# Patient Record
Sex: MALE | Race: WHITE | NOT HISPANIC OR LATINO | Employment: FULL TIME | URBAN - METROPOLITAN AREA
[De-identification: names, ages, dates, MRNs, and addresses within clinical notes are randomized per-mention and may not be internally consistent; named-entity substitution may affect disease eponyms.]

---

## 2019-02-03 ENCOUNTER — HOSPITAL ENCOUNTER (EMERGENCY)
Facility: HOSPITAL | Age: 44
Discharge: HOME | End: 2019-02-03
Attending: EMERGENCY MEDICINE | Admitting: EMERGENCY MEDICINE
Payer: COMMERCIAL

## 2019-02-03 ENCOUNTER — APPOINTMENT (EMERGENCY)
Dept: RADIOLOGY | Facility: HOSPITAL | Age: 44
End: 2019-02-03
Payer: COMMERCIAL

## 2019-02-03 VITALS
DIASTOLIC BLOOD PRESSURE: 88 MMHG | SYSTOLIC BLOOD PRESSURE: 128 MMHG | RESPIRATION RATE: 16 BRPM | WEIGHT: 203.04 LBS | OXYGEN SATURATION: 99 % | BODY MASS INDEX: 28.32 KG/M2 | TEMPERATURE: 98 F | HEART RATE: 66 BPM

## 2019-02-03 DIAGNOSIS — S29.012A UPPER BACK STRAIN, INITIAL ENCOUNTER: Primary | ICD-10-CM

## 2019-02-03 PROCEDURE — 99283 EMERGENCY DEPT VISIT LOW MDM: CPT

## 2019-02-03 PROCEDURE — 96372 THER/PROPH/DIAG INJ SC/IM: CPT

## 2019-02-03 PROCEDURE — 72072 X-RAY EXAM THORAC SPINE 3VWS: CPT

## 2019-02-03 RX ORDER — KETOROLAC TROMETHAMINE 30 MG/ML
30 INJECTION, SOLUTION INTRAMUSCULAR; INTRAVENOUS ONCE
Status: COMPLETED | OUTPATIENT
Start: 2019-02-03 | End: 2019-02-03

## 2019-02-03 RX ORDER — CYCLOBENZAPRINE HCL 5 MG
5 TABLET ORAL 3 TIMES DAILY PRN
Qty: 30 TABLET | Refills: 0 | Status: SHIPPED | OUTPATIENT
Start: 2019-02-03 | End: 2019-10-09 | Stop reason: ALTCHOICE

## 2019-02-03 RX ORDER — DIAZEPAM 5 MG/1
5 TABLET ORAL
Qty: 10 TABLET | Refills: 0 | Status: SHIPPED | OUTPATIENT
Start: 2019-02-03 | End: 2019-10-09 | Stop reason: ALTCHOICE

## 2019-02-03 RX ORDER — DIAZEPAM 5 MG/ML
5 INJECTION, SOLUTION INTRAMUSCULAR; INTRAVENOUS ONCE
Status: COMPLETED | OUTPATIENT
Start: 2019-02-03 | End: 2019-02-03

## 2019-02-03 RX ADMIN — KETOROLAC TROMETHAMINE 30 MG: 30 INJECTION, SOLUTION INTRAMUSCULAR at 15:46

## 2019-02-03 RX ADMIN — Medication 5 MG: at 15:46

## 2019-02-03 NOTE — ED PROVIDER NOTES
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  Was very cramped, using tools predominately w his R hand  Has R shoulder soreness  However now has moderate to severe pain in his upper thoracic spine w some paraspinal muscle soreness described  Denies numbness or tingling of arms / legs  No LE pain or weakness  Denies saddle anesthesia and bladder or bowel incontinence  He denies weakness in arms  He has pain in the back any time he twists his spine or rotates his neck or moves his arms  None       History reviewed  No pertinent past medical history  Past Surgical History:   Procedure Laterality Date    SHOULDER SURGERY         History reviewed  No pertinent family history  I have reviewed and agree with the history as documented  Social History   Substance Use Topics    Smoking status: Never Smoker    Smokeless tobacco: Never Used    Alcohol use No        Review of Systems   Constitutional: Negative for activity change, chills, diaphoresis, fatigue and fever  HENT: Negative for congestion and rhinorrhea  Eyes: Negative for pain  Respiratory: Negative for cough, chest tightness, shortness of breath and wheezing  Cardiovascular: Negative for chest pain and palpitations  Gastrointestinal: Negative for abdominal distention, constipation, diarrhea, nausea and vomiting  Genitourinary: Negative for difficulty urinating and dysuria  Musculoskeletal: Positive for back pain  Negative for arthralgias and myalgias     Neurological: Negative for dizziness, weakness, light-headedness and headaches  Psychiatric/Behavioral: The patient is not nervous/anxious  Physical Exam  Physical Exam   Constitutional: He is oriented to person, place, and time  He appears well-developed and well-nourished  No distress  HENT:   Head: Normocephalic and atraumatic  Eyes: Pupils are equal, round, and reactive to light  Neck: Normal range of motion  Neck supple  No tracheal deviation present  Cardiovascular: Normal rate, regular rhythm, normal heart sounds and intact distal pulses  Exam reveals no gallop and no friction rub  No murmur heard  Pulmonary/Chest: Effort normal and breath sounds normal  No respiratory distress  He has no wheezes  He has no rales  Abdominal: Soft  Bowel sounds are normal  He exhibits no distension and no mass  There is no tenderness  There is no guarding  Musculoskeletal: He exhibits no edema or deformity  Midline ttp along the upper thoracic vertebrae   There is no stepoff or deformity  Patient has mild ttp of adjacent paraspinal muscles  No c spine ttp   Pain w twisting spine or elevating arms but he has normal arm strength / sensation in all muscles groups UE, equal bilaterally   Neurological: He is alert and oriented to person, place, and time  Skin: Skin is warm and dry  He is not diaphoretic  Psychiatric: He has a normal mood and affect  His behavior is normal    Nursing note and vitals reviewed        Vital Signs  ED Triage Vitals [02/03/19 1452]   Temperature Pulse Respirations Blood Pressure SpO2   98 °F (36 7 °C) 66 16 135/83 98 %      Temp Source Heart Rate Source Patient Position - Orthostatic VS BP Location FiO2 (%)   Oral Monitor Sitting Right arm --      Pain Score       7           Vitals:    02/03/19 1452 02/03/19 1620   BP: 135/83 128/88   Pulse: 66 66   Patient Position - Orthostatic VS: Sitting Sitting       Visual Acuity      ED Medications  Medications   ketorolac (TORADOL) injection 30 mg (30 mg Intramuscular Given 2/3/19 1546)   diazepam (VALIUM) injection 5 mg (5 mg Intramuscular Given 2/3/19 1546)       Diagnostic Studies  Results Reviewed     None                 XR spine thoracic 3 views    (Results Pending)              Procedures  Procedures       Phone Contacts  ED Phone Contact    ED Course                               MDM  Number of Diagnoses or Management Options  Upper back strain, initial encounter:   Diagnosis management comments: DDX includes but not ltd to:   Concern for strain of thoracic spine, surrounding musculature v herniated disk  There are no radicular sx   No sx of cord compression on physical exam  No ambulatory dysfunction or sx of cauda equina    Plan is to obtain:  XR of affected joint(s) for acute osseous abnormality     Based on results:  No acute abn on imaging  Will refer to comprehensive spine program and have pt follow w nsgx  Muscle relaxers PRN w nsaids and tylenol  Return parameters discussed  Pt requires f/u as an outpt  Pt expresses understanding w above treatment plan  All questions answered prior to d/c  Portions of the record may have been created with voice recognition software   Occasional wrong word or "sound a like" substitutions may have occurred due to the inherent limitations of voice recognition software   Read the chart carefully and recognize, using context, where substitutions have occurred  Disposition  Final diagnoses:   Upper back strain, initial encounter     Time reflects when diagnosis was documented in both MDM as applicable and the Disposition within this note     Time User Action Codes Description Comment    2/3/2019  4:10 PM Trisha Mcmahan Add [S29 012A] Upper back strain, initial encounter       ED Disposition     ED Disposition Condition Date/Time Comment    Discharge  Sun Feb 3, 2019  4:10 PM Deepika Jenkins discharge to home/self care      Condition at discharge: Good        Follow-up Information     Follow up With Specialties Details Why Contact Info Additional Information    [address] [facility] Emergency Department Emergency Medicine  If symptoms worsen [address] [facility] [place] [address]  [phone] [place] ED, [address], [facility], [address] [facility], [address] [facility] Neurosurgery Call in 1 day  [address] [facility] [address]  [address], [address] [facility], [id number]          Discharge Medication List as of 2/3/2019  4:13 PM      START taking these medications    Details   cyclobenzaprine (FLEXERIL) 5 mg tablet Take 1 tablet (5 mg total) by mouth 3 (three) times a day as needed for muscle spasms for up to 30 doses, Starting Sun 2/3/2019, Print      diazepam (VALIUM) 5 mg tablet Take 1 tablet (5 mg total) by mouth daily at bedtime as needed for anxiety for up to 10 days, Starting Sun 2/3/2019, Until Wed 2/13/2019, Print             Outpatient Discharge Orders  Ambulatory Referral to Comprehensive Spine Program   Standing Status: Future  Standing Exp   Date: 08/03/19         ED Provider  Electronically Signed by           Tavo Velez PA-C  02/03/19 1629

## 2019-02-03 NOTE — DISCHARGE INSTRUCTIONS
[place] [facility] [place]  PROGRESS NOTE  Chief Complaint:   No chief complaint on file. HPI:   This is a 39year old male coming in for his annual physical.    He said that he is not having any trouble with the simvastatin.   Is not causing any si Thoracic Pain   WHAT YOU NEED TO KNOW:   Thoracic pain is discomfort in any area between your neck and your abdomen  Thoracic pain may be caused by health conditions that affect your gastrointestinal system, lungs, bones, or muscles  It can also be caused by trauma, panic attacks, or anxiety related to stress  DISCHARGE INSTRUCTIONS:   Return to the emergency department if:   · You have a period of thoracic pain that lasts longer than 5 minutes  · Your thoracic pain gets worse  · You have a history of angina (pressure or squeezing chest pain) and your usual medicine does not work  · You have thoracic pain with shortness of breath, sweating, dizziness, vomiting, or nausea  · You have thoracic pain that spreads to your arm, neck, back, jaw, or stomach  Contact your healthcare provider or specialist if:   · Your thoracic pain is not relieved by resting, heat, or medicines  · You have questions or concerns about your condition or care  Medicines: You may need any of the following:  · Acetaminophen  decreases pain  It is available without a prescription  Ask how much to take and how often to take it  Follow directions  Acetaminophen can cause liver damage if not taken correctly  · NSAIDs , such as ibuprofen, help decrease swelling, pain, and fever  This medicine is available with or without a doctor's order  NSAIDs can cause stomach bleeding or kidney problems in certain people  If you take blood thinner medicine, always ask if NSAIDs are safe for you  Always read the medicine label and follow directions  Do not give these medicines to children under 10months of age without direction from your child's healthcare provider  · Take your medicine as directed  Contact your healthcare provider if you think your medicine is not helping or if you have side effects  Tell him of her if you are allergic to any medicine  Keep a list of the medicines, vitamins, and herbs you take   Include the - 46.3 fL   Neutrophil Absolute Prelim 3.00 1.30 - 6.70 x10 (3) uL   Neutrophil Absolute 3.00 1.30 - 6.70 x10(3) uL   Lymphocyte Absolute 1.88 0.90 - 4.00 x10(3) uL   Monocyte Absolute 0.38 0.10 - 0.60 x10(3) uL   Eosinophil Absolute 0.11 0.00 - 0.30 x10(3 amounts, and when and why you take them  Bring the list or the pill bottles to follow-up visits  Carry your medicine list with you in case of an emergency  Follow up with your healthcare provider or specialist as directed:  Write down your questions so you remember to ask them during your visits  Self-care:   · Apply heat  to the area  Heat helps decrease pain and muscle spasms  Apply heat on the area for 20 to 30 minutes every 2 hours for as many days as directed  · Limit physical activity that causes pain  Rest as needed  Ask your healthcare provider how long you should limit activity  © 2017 [address] [facility] Information is for End User's use only and may not be sold, redistributed or otherwise used for commercial purposes  All illustrations and images included in CareNotes® are the copyrighted property of [employer]  or Marito Stanley  The above information is an  only  It is not intended as medical advice for individual conditions or treatments  Talk to your doctor, nurse or pharmacist before following any medical regimen to see if it is safe and effective for you  rest.  RESPIRATORY:  Denies shortness of breath, wheezing, cough or sputum. GASTROINTESTINAL: He denies any vomiting or diarrhea. He said that he has not been having hard stools. However his hemorrhoid symptoms have started to return.   MUSCULOSKELETAL: bowel sounds normal in all 4 quadrants, no masses, no hepatosplenomegaly. EXTREMITIES:  No edema, no cyanosis, no clubbing, FROM, 2+ dorsalis pedis pulses bilaterally. NEURO:  No deficit, normal gait, strength and tone, sensory intact, normal reflexes. obesity due to excess calories without serious comorbidity with body mass index (BMI) of 36.0 to 36.9 in adult      Tez Dill MD  5/4/2018  8:49 AM

## 2019-02-04 ENCOUNTER — TELEPHONE (OUTPATIENT)
Dept: PHYSICAL THERAPY | Facility: OTHER | Age: 44
End: 2019-02-04

## 2019-02-08 ENCOUNTER — TELEPHONE (OUTPATIENT)
Dept: PHYSICAL THERAPY | Facility: OTHER | Age: 44
End: 2019-02-08

## 2019-08-20 DIAGNOSIS — Z00.00 ANNUAL PHYSICAL EXAM: Primary | ICD-10-CM

## 2019-09-07 ENCOUNTER — APPOINTMENT (OUTPATIENT)
Dept: LAB | Facility: CLINIC | Age: 44
End: 2019-09-07
Payer: COMMERCIAL

## 2019-09-07 DIAGNOSIS — Z00.00 ANNUAL PHYSICAL EXAM: ICD-10-CM

## 2019-09-07 LAB
ALBUMIN SERPL BCP-MCNC: 4.3 G/DL (ref 3.5–5)
ALP SERPL-CCNC: 72 U/L (ref 46–116)
ALT SERPL W P-5'-P-CCNC: 32 U/L (ref 12–78)
ANION GAP SERPL CALCULATED.3IONS-SCNC: 8 MMOL/L (ref 4–13)
AST SERPL W P-5'-P-CCNC: 15 U/L (ref 5–45)
BILIRUB SERPL-MCNC: 0.78 MG/DL (ref 0.2–1)
BUN SERPL-MCNC: 15 MG/DL (ref 5–25)
CALCIUM SERPL-MCNC: 9.1 MG/DL (ref 8.3–10.1)
CHLORIDE SERPL-SCNC: 105 MMOL/L (ref 100–108)
CHOLEST SERPL-MCNC: 187 MG/DL (ref 50–200)
CO2 SERPL-SCNC: 28 MMOL/L (ref 21–32)
CREAT SERPL-MCNC: 1.02 MG/DL (ref 0.6–1.3)
GFR SERPL CREATININE-BSD FRML MDRD: 89 ML/MIN/1.73SQ M
GLUCOSE P FAST SERPL-MCNC: 96 MG/DL (ref 65–99)
HDLC SERPL-MCNC: 35 MG/DL (ref 40–60)
LDLC SERPL CALC-MCNC: 123 MG/DL (ref 0–100)
NONHDLC SERPL-MCNC: 152 MG/DL
POTASSIUM SERPL-SCNC: 4.1 MMOL/L (ref 3.5–5.3)
PROT SERPL-MCNC: 7.2 G/DL (ref 6.4–8.2)
SODIUM SERPL-SCNC: 141 MMOL/L (ref 136–145)
TRIGL SERPL-MCNC: 145 MG/DL

## 2019-09-07 PROCEDURE — 36415 COLL VENOUS BLD VENIPUNCTURE: CPT

## 2019-09-07 PROCEDURE — 80061 LIPID PANEL: CPT

## 2019-09-07 PROCEDURE — 80053 COMPREHEN METABOLIC PANEL: CPT

## 2019-10-09 ENCOUNTER — OFFICE VISIT (OUTPATIENT)
Dept: FAMILY MEDICINE CLINIC | Facility: CLINIC | Age: 44
End: 2019-10-09
Payer: COMMERCIAL

## 2019-10-09 VITALS
HEART RATE: 80 BPM | DIASTOLIC BLOOD PRESSURE: 78 MMHG | WEIGHT: 203 LBS | OXYGEN SATURATION: 97 % | BODY MASS INDEX: 28.42 KG/M2 | HEIGHT: 71 IN | SYSTOLIC BLOOD PRESSURE: 118 MMHG

## 2019-10-09 DIAGNOSIS — Z00.00 ANNUAL PHYSICAL EXAM: Primary | ICD-10-CM

## 2019-10-09 PROCEDURE — 99396 PREV VISIT EST AGE 40-64: CPT | Performed by: FAMILY MEDICINE

## 2019-10-09 NOTE — PROGRESS NOTES
Assessment/Plan:       Problem List Items Addressed This Visit        Other    Annual physical exam - Primary     Annual physical exam completed patient in good health overall labs reviewed at this time total cholesterol 187 all other lab work in normal range follow-up in 1 year patient will continue to stay active exercise and keep his weight down and watch diet                 Subjective:      Patient ID: Barry Zapata is a 40 y o  male  Patient presents for annual physical exam for appointment doing well no new problems or complaints he had lab work done which will be reviewed at today's visit      The following portions of the patient's history were reviewed and updated as appropriate: allergies, current medications, past family history, past medical history, past social history, past surgical history and problem list     Review of Systems   Constitutional: Negative for chills, fatigue and fever  HENT: Negative for congestion, nosebleeds, rhinorrhea, sinus pressure and sore throat  Eyes: Negative for discharge and redness  Respiratory: Negative for cough and shortness of breath  Cardiovascular: Negative for chest pain, palpitations and leg swelling  Gastrointestinal: Negative for abdominal pain, blood in stool and nausea  Endocrine: Negative for cold intolerance, heat intolerance and polyuria  Genitourinary: Negative for dysuria and frequency  Musculoskeletal: Negative for arthralgias, back pain and myalgias  Skin: Negative for rash  Neurological: Negative for dizziness, weakness and headaches  Hematological: Negative for adenopathy  Psychiatric/Behavioral: Negative for behavioral problems and sleep disturbance  The patient is not nervous/anxious            Objective:      /78 (BP Location: Left arm, Patient Position: Sitting)   Pulse 80   Ht 5' 11" (1 803 m)   Wt 92 1 kg (203 lb)   SpO2 97%   BMI 28 31 kg/m²        Physical Exam   Constitutional: He is oriented to person, place, and time  He appears well-developed and well-nourished  HENT:   Head: Normocephalic and atraumatic  Right Ear: External ear normal    Left Ear: External ear normal    Nose: Nose normal    Mouth/Throat: Oropharynx is clear and moist    Eyes: Pupils are equal, round, and reactive to light  Conjunctivae and EOM are normal  No scleral icterus  Neck: Normal range of motion  Neck supple  No JVD present  No thyromegaly present  Cardiovascular: Normal rate, regular rhythm and normal heart sounds  No murmur heard  Pulmonary/Chest: Effort normal and breath sounds normal  He has no wheezes  He has no rales  He exhibits no tenderness  Abdominal: Soft  Bowel sounds are normal  He exhibits no distension and no mass  There is no tenderness  There is no rebound and no guarding  Musculoskeletal: Normal range of motion  He exhibits no edema, tenderness or deformity  Lymphadenopathy:     He has no cervical adenopathy  Neurological: He is alert and oriented to person, place, and time  He has normal reflexes  He displays normal reflexes  No cranial nerve deficit  Skin: Skin is warm and dry  No rash noted  No erythema  Psychiatric: He has a normal mood and affect  His behavior is normal  Judgment and thought content normal    Nursing note and vitals reviewed  Data:    Laboratory Results: I have personally reviewed the pertinent laboratory results/reports   Radiology/Other Diagnostic Testing Results: I have personally reviewed pertinent reports         No results found for: WBC, HGB, HCT, MCV, PLT  Lab Results   Component Value Date    K 4 1 09/07/2019     09/07/2019    CO2 28 09/07/2019    BUN 15 09/07/2019    CREATININE 1 02 09/07/2019    GLUF 96 09/07/2019    CALCIUM 9 1 09/07/2019    AST 15 09/07/2019    ALT 32 09/07/2019    ALKPHOS 72 09/07/2019    EGFR 89 09/07/2019     Lab Results   Component Value Date    CHOLESTEROL 187 09/07/2019     Lab Results   Component Value Date    HDL 35 (L) 09/07/2019     Lab Results   Component Value Date    LDLCALC 123 (H) 09/07/2019     Lab Results   Component Value Date    TRIG 145 09/07/2019     No results found for: CHOLHDL  No results found for: [id number], TSH  No results found for: HGBA1C  No results found for: PSA    [facility] OLVINLiz, DO

## 2019-10-09 NOTE — PATIENT INSTRUCTIONS

## 2019-10-09 NOTE — ASSESSMENT & PLAN NOTE
Annual physical exam completed patient in good health overall labs reviewed at this time total cholesterol 187 all other lab work in normal range follow-up in 1 year patient will continue to stay active exercise and keep his weight down and watch diet

## 2021-04-05 DIAGNOSIS — Z23 ENCOUNTER FOR IMMUNIZATION: ICD-10-CM

## 2021-05-11 ENCOUNTER — OFFICE VISIT (OUTPATIENT)
Dept: FAMILY MEDICINE CLINIC | Facility: CLINIC | Age: 46
End: 2021-05-11
Payer: COMMERCIAL

## 2021-05-11 VITALS
DIASTOLIC BLOOD PRESSURE: 82 MMHG | TEMPERATURE: 98.1 F | OXYGEN SATURATION: 96 % | HEIGHT: 71 IN | BODY MASS INDEX: 28.84 KG/M2 | SYSTOLIC BLOOD PRESSURE: 120 MMHG | WEIGHT: 206 LBS | HEART RATE: 84 BPM

## 2021-05-11 DIAGNOSIS — M54.2 NECK PAIN: Primary | ICD-10-CM

## 2021-05-11 PROCEDURE — 3008F BODY MASS INDEX DOCD: CPT | Performed by: FAMILY MEDICINE

## 2021-05-11 PROCEDURE — 1036F TOBACCO NON-USER: CPT | Performed by: FAMILY MEDICINE

## 2021-05-11 PROCEDURE — 99213 OFFICE O/P EST LOW 20 MIN: CPT | Performed by: FAMILY MEDICINE

## 2021-05-11 PROCEDURE — 3725F SCREEN DEPRESSION PERFORMED: CPT | Performed by: FAMILY MEDICINE

## 2021-05-11 NOTE — PATIENT INSTRUCTIONS
Neck Exercises   AMBULATORY CARE:   Neck exercises  help reduce neck pain, and improve neck movement and strength  Neck exercises also help prevent long-term neck problems  What you need to know about neck exercises:   · Do the exercises every day,  or as often as directed by your healthcare provider  · Move slowly, gently, and smoothly  Avoid fast or jerky motions  · Stand and sit the way your healthcare provider shows you  Good posture may reduce your neck pain  Check your posture often, even when you are not doing your neck exercises  How to perform neck exercises safely:   · Exercise position:  You may sit or stand while you do neck exercises  Face forward  Your shoulders should be straight and relaxed, with a good posture  · Head tilts, forward and back:  Gently bow your head and try to touch your chin to your chest  Your healthcare provider may tell you to push on the back of your neck to help bow your head  Raise your chin back to the starting position  Tilt your head back as far as possible so you are looking up at the ceiling  Your healthcare provider may tell you to lift your chin to help tilt your head back  Return your head to the starting position  · Head tilts, side to side:  Tilt your head, bringing your ear toward your shoulder  Then tilt your head toward the other shoulder  · Head turns:  Turn your head to look over your shoulder  Tilt your chin down and try to touch it to your shoulder  Do not raise your shoulder to your chin  Face forward again  Do the same on the other side  · Head rolls:  Slowly bring your chin toward your chest  Next, roll your head to the right  Your ear should be positioned over your shoulder  Hold this position for 5 seconds  Roll your head back toward your chest and to the left into the same position  Hold for 5 seconds  Gently roll your head back and around in a clockwise [place] 3 times   Next, move your head in the reverse direction (counterclockwise) in a [place] 3 times  Do not shrug your shoulders upwards while you do this exercise  Contact your healthcare provider if:   · Your pain does not get better, or gets worse  · You have questions or concerns about your condition, care, or exercise program     © Copyright [address] Information is for End User's use only and may not be sold, redistributed or otherwise used for commercial purposes  All illustrations and images included in CareNotes® are the copyrighted property of [employer] Inc  or [facility] Pedro Hernandez   The above information is an  only  It is not intended as medical advice for individual conditions or treatments  Talk to your doctor, nurse or pharmacist before following any medical regimen to see if it is safe and effective for you

## 2021-05-11 NOTE — ASSESSMENT & PLAN NOTE
Occipital pain left-sided neck patient has range of motion exercises on manipulation therapy active correction at C2-3 left side with relief he will do home exercises ice and moist heat over the next several days and contact me if symptoms change or worsen

## 2021-05-11 NOTE — PROGRESS NOTES
BMI Counseling: Body mass index is 28 73 kg/m²  The BMI is above normal  Nutrition recommendations include reducing portion sizes, decreasing overall calorie intake, 3-5 servings of fruits/vegetables daily, reducing fast food intake, consuming healthier snacks, decreasing soda and/or juice intake, moderation in carbohydrate intake, increasing intake of lean protein, reducing intake of saturated fat and trans fat and reducing intake of cholesterol  Exercise recommendations include exercising 3-5 times per week

## 2021-05-11 NOTE — PROGRESS NOTES
Assessment/Plan:       Problem List Items Addressed This Visit        Other    Neck pain - Primary     Occipital pain left-sided neck patient has range of motion exercises on manipulation therapy active correction at C2-3 left side with relief he will do home exercises ice and moist heat over the next several days and contact me if symptoms change or worsen                 Subjective:      Patient ID: Nisha Vail is a 55 y o  male  Neck pain head ache left side occipital      The following portions of the patient's history were reviewed and updated as appropriate: allergies, current medications, past family history, past medical history, past social history, past surgical history and problem list     Review of Systems   Constitutional: Negative for chills, fatigue and fever  HENT: Negative for congestion, nosebleeds, rhinorrhea, sinus pressure and sore throat  Eyes: Negative for discharge and redness  Respiratory: Negative for cough and shortness of breath  Cardiovascular: Negative for chest pain, palpitations and leg swelling  Gastrointestinal: Negative for abdominal pain, blood in stool and nausea  Endocrine: Negative for cold intolerance, heat intolerance and polyuria  Genitourinary: Negative for dysuria and frequency  Musculoskeletal: Negative for arthralgias, back pain and myalgias  Skin: Negative for rash  Neurological: Negative for dizziness, weakness and headaches  Hematological: Negative for adenopathy  Psychiatric/Behavioral: Negative for behavioral problems and sleep disturbance  The patient is not nervous/anxious  Objective:      /82 (BP Location: Left arm, Patient Position: Sitting, Cuff Size: Large)   Pulse 84   Temp 98 1 °F (36 7 °C) (Tympanic)   Ht 5' 11" (1 803 m)   Wt 93 4 kg (206 lb)   SpO2 96%   BMI 28 73 kg/m²        Physical Exam  Vitals signs and nursing note reviewed  Constitutional:       Appearance: Normal appearance   He is well-developed and normal weight  HENT:      Head: Normocephalic and atraumatic  Right Ear: Tympanic membrane, ear canal and external ear normal       Left Ear: Ear canal and external ear normal       Nose: Nose normal       Mouth/Throat:      Mouth: Mucous membranes are moist       Pharynx: Oropharynx is clear  Eyes:      General: No scleral icterus  Conjunctiva/sclera: Conjunctivae normal       Pupils: Pupils are equal, round, and reactive to light  Neck:      Musculoskeletal: Normal range of motion and neck supple  Thyroid: No thyromegaly  Vascular: No JVD  Cardiovascular:      Rate and Rhythm: Normal rate and regular rhythm  Heart sounds: Normal heart sounds  No murmur  Pulmonary:      Effort: Pulmonary effort is normal       Breath sounds: Normal breath sounds  No wheezing or rales  Chest:      Chest wall: No tenderness  Abdominal:      General: Bowel sounds are normal  There is no distension  Palpations: Abdomen is soft  There is no mass  Tenderness: There is no abdominal tenderness  There is no guarding or rebound  Musculoskeletal: Normal range of motion  General: No tenderness or deformity  Lymphadenopathy:      Cervical: No cervical adenopathy  Skin:     General: Skin is warm and dry  Findings: No erythema or rash  Neurological:      General: No focal deficit present  Mental Status: He is alert and oriented to person, place, and time  Cranial Nerves: No cranial nerve deficit  Deep Tendon Reflexes: Reflexes are normal and symmetric  Reflexes normal    Psychiatric:         Mood and Affect: Mood normal          Behavior: Behavior normal          Thought Content: Thought content normal          Judgment: Judgment normal           Data:    Laboratory Results: I have personally reviewed the pertinent laboratory results/reports   Radiology/Other Diagnostic Testing Results: I have personally reviewed pertinent reports         No results found for: WBC, HGB, HCT, MCV, PLT  Lab Results   Component Value Date    K 4 1 09/07/2019     09/07/2019    CO2 28 09/07/2019    BUN 15 09/07/2019    CREATININE 1 02 09/07/2019    GLUF 96 09/07/2019    CALCIUM 9 1 09/07/2019    AST 15 09/07/2019    ALT 32 09/07/2019    ALKPHOS 72 09/07/2019    EGFR 89 09/07/2019     Lab Results   Component Value Date    CHOLESTEROL 187 09/07/2019     Lab Results   Component Value Date    HDL 35 (L) 09/07/2019     Lab Results   Component Value Date    LDLCALC 123 (H) 09/07/2019     Lab Results   Component Value Date    TRIG 145 09/07/2019     No results found for: CHOLHDL  No results found for: [id number], TSH  No results found for: HGBA1C  No results found for: PSA    Burtis Duverney, DO

## 2021-09-25 ENCOUNTER — APPOINTMENT (OUTPATIENT)
Dept: LAB | Facility: CLINIC | Age: 46
End: 2021-09-25
Payer: COMMERCIAL

## 2021-09-25 DIAGNOSIS — Z00.00 ANNUAL PHYSICAL EXAM: ICD-10-CM

## 2021-09-25 LAB
ALBUMIN SERPL BCP-MCNC: 3.8 G/DL (ref 3.5–5)
ALP SERPL-CCNC: 69 U/L (ref 46–116)
ALT SERPL W P-5'-P-CCNC: 38 U/L (ref 12–78)
ANION GAP SERPL CALCULATED.3IONS-SCNC: 0 MMOL/L (ref 4–13)
AST SERPL W P-5'-P-CCNC: 14 U/L (ref 5–45)
BASOPHILS # BLD AUTO: 0.04 THOUSANDS/ΜL (ref 0–0.1)
BASOPHILS NFR BLD AUTO: 1 % (ref 0–1)
BILIRUB SERPL-MCNC: 0.44 MG/DL (ref 0.2–1)
BUN SERPL-MCNC: 17 MG/DL (ref 5–25)
CALCIUM SERPL-MCNC: 8.9 MG/DL (ref 8.3–10.1)
CHLORIDE SERPL-SCNC: 104 MMOL/L (ref 100–108)
CHOLEST SERPL-MCNC: 200 MG/DL (ref 50–200)
CO2 SERPL-SCNC: 34 MMOL/L (ref 21–32)
CREAT SERPL-MCNC: 1.02 MG/DL (ref 0.6–1.3)
EOSINOPHIL # BLD AUTO: 0.15 THOUSAND/ΜL (ref 0–0.61)
EOSINOPHIL NFR BLD AUTO: 3 % (ref 0–6)
ERYTHROCYTE [DISTWIDTH] IN BLOOD BY AUTOMATED COUNT: 12.8 % (ref 11.6–15.1)
GFR SERPL CREATININE-BSD FRML MDRD: 88 ML/MIN/1.73SQ M
GLUCOSE P FAST SERPL-MCNC: 98 MG/DL (ref 65–99)
HCT VFR BLD AUTO: 50.3 % (ref 36.5–49.3)
HDLC SERPL-MCNC: 37 MG/DL
HGB BLD-MCNC: 16.6 G/DL (ref 12–17)
IMM GRANULOCYTES # BLD AUTO: 0.01 THOUSAND/UL (ref 0–0.2)
IMM GRANULOCYTES NFR BLD AUTO: 0 % (ref 0–2)
LDLC SERPL CALC-MCNC: 133 MG/DL (ref 0–100)
LYMPHOCYTES # BLD AUTO: 1.45 THOUSANDS/ΜL (ref 0.6–4.47)
LYMPHOCYTES NFR BLD AUTO: 28 % (ref 14–44)
MCH RBC QN AUTO: 29.8 PG (ref 26.8–34.3)
MCHC RBC AUTO-ENTMCNC: 33 G/DL (ref 31.4–37.4)
MCV RBC AUTO: 90 FL (ref 82–98)
MONOCYTES # BLD AUTO: 0.51 THOUSAND/ΜL (ref 0.17–1.22)
MONOCYTES NFR BLD AUTO: 10 % (ref 4–12)
NEUTROPHILS # BLD AUTO: 3.1 THOUSANDS/ΜL (ref 1.85–7.62)
NEUTS SEG NFR BLD AUTO: 58 % (ref 43–75)
NONHDLC SERPL-MCNC: 163 MG/DL
NRBC BLD AUTO-RTO: 0 /100 WBCS
PLATELET # BLD AUTO: 257 THOUSANDS/UL (ref 149–390)
PMV BLD AUTO: 9.6 FL (ref 8.9–12.7)
POTASSIUM SERPL-SCNC: 4.2 MMOL/L (ref 3.5–5.3)
PROT SERPL-MCNC: 7 G/DL (ref 6.4–8.2)
RBC # BLD AUTO: 5.57 MILLION/UL (ref 3.88–5.62)
SODIUM SERPL-SCNC: 138 MMOL/L (ref 136–145)
T4 FREE SERPL-MCNC: 0.73 NG/DL (ref 0.76–1.46)
TRIGL SERPL-MCNC: 150 MG/DL
TSH SERPL DL<=0.05 MIU/L-ACNC: 15.6 UIU/ML (ref 0.36–3.74)
WBC # BLD AUTO: 5.26 THOUSAND/UL (ref 4.31–10.16)

## 2021-09-25 PROCEDURE — 80053 COMPREHEN METABOLIC PANEL: CPT

## 2021-09-25 PROCEDURE — 36415 COLL VENOUS BLD VENIPUNCTURE: CPT

## 2021-09-25 PROCEDURE — 84439 ASSAY OF FREE THYROXINE: CPT

## 2021-09-25 PROCEDURE — 84443 ASSAY THYROID STIM HORMONE: CPT

## 2021-09-25 PROCEDURE — 84153 ASSAY OF PSA TOTAL: CPT

## 2021-09-25 PROCEDURE — 85025 COMPLETE CBC W/AUTO DIFF WBC: CPT

## 2021-09-25 PROCEDURE — 80061 LIPID PANEL: CPT

## 2021-09-25 PROCEDURE — 84154 ASSAY OF PSA FREE: CPT

## 2021-09-27 LAB
PSA FREE MFR SERPL: 48.3 %
PSA FREE SERPL-MCNC: 0.29 NG/ML
PSA SERPL-MCNC: 0.6 NG/ML (ref 0–4)

## 2021-10-01 ENCOUNTER — OFFICE VISIT (OUTPATIENT)
Dept: FAMILY MEDICINE CLINIC | Facility: CLINIC | Age: 46
End: 2021-10-01
Payer: COMMERCIAL

## 2021-10-01 VITALS
BODY MASS INDEX: 28.53 KG/M2 | HEART RATE: 88 BPM | OXYGEN SATURATION: 97 % | SYSTOLIC BLOOD PRESSURE: 120 MMHG | DIASTOLIC BLOOD PRESSURE: 80 MMHG | HEIGHT: 71 IN | WEIGHT: 203.8 LBS

## 2021-10-01 DIAGNOSIS — Z00.00 ANNUAL PHYSICAL EXAM: Primary | ICD-10-CM

## 2021-10-01 DIAGNOSIS — E03.8 OTHER SPECIFIED HYPOTHYROIDISM: ICD-10-CM

## 2021-10-01 PROCEDURE — 3008F BODY MASS INDEX DOCD: CPT | Performed by: FAMILY MEDICINE

## 2021-10-01 PROCEDURE — 1036F TOBACCO NON-USER: CPT | Performed by: FAMILY MEDICINE

## 2021-10-01 PROCEDURE — 99396 PREV VISIT EST AGE 40-64: CPT | Performed by: FAMILY MEDICINE

## 2021-10-01 RX ORDER — LEVOTHYROXINE SODIUM 0.05 MG/1
50 TABLET ORAL
Qty: 30 TABLET | Refills: 5 | Status: SHIPPED | OUTPATIENT
Start: 2021-10-01 | End: 2022-01-14 | Stop reason: SDUPTHER

## 2021-11-05 ENCOUNTER — APPOINTMENT (OUTPATIENT)
Dept: LAB | Facility: CLINIC | Age: 46
End: 2021-11-05
Payer: COMMERCIAL

## 2021-11-05 DIAGNOSIS — E06.5 HYPOTHYROIDISM DUE TO FIBROUS INVASIVE THYROIDITIS: ICD-10-CM

## 2021-11-05 DIAGNOSIS — E03.8 HYPOTHYROIDISM DUE TO FIBROUS INVASIVE THYROIDITIS: ICD-10-CM

## 2021-11-05 LAB
T4 FREE SERPL-MCNC: 0.94 NG/DL (ref 0.76–1.46)
TSH SERPL DL<=0.05 MIU/L-ACNC: 6.35 UIU/ML (ref 0.36–3.74)

## 2021-11-05 PROCEDURE — 84443 ASSAY THYROID STIM HORMONE: CPT

## 2021-11-05 PROCEDURE — 84439 ASSAY OF FREE THYROXINE: CPT

## 2021-11-05 PROCEDURE — 36415 COLL VENOUS BLD VENIPUNCTURE: CPT

## 2021-11-17 ENCOUNTER — TELEPHONE (OUTPATIENT)
Dept: FAMILY MEDICINE CLINIC | Facility: CLINIC | Age: 46
End: 2021-11-17

## 2021-11-19 ENCOUNTER — TELEPHONE (OUTPATIENT)
Dept: FAMILY MEDICINE CLINIC | Facility: CLINIC | Age: 46
End: 2021-11-19

## 2021-11-19 DIAGNOSIS — E03.8 OTHER SPECIFIED HYPOTHYROIDISM: Primary | ICD-10-CM

## 2021-12-13 ENCOUNTER — OFFICE VISIT (OUTPATIENT)
Dept: FAMILY MEDICINE CLINIC | Facility: CLINIC | Age: 46
End: 2021-12-13
Payer: COMMERCIAL

## 2021-12-13 VITALS
HEART RATE: 91 BPM | DIASTOLIC BLOOD PRESSURE: 78 MMHG | BODY MASS INDEX: 29.4 KG/M2 | HEIGHT: 71 IN | OXYGEN SATURATION: 98 % | RESPIRATION RATE: 18 BRPM | WEIGHT: 210 LBS | SYSTOLIC BLOOD PRESSURE: 138 MMHG | TEMPERATURE: 98.2 F

## 2021-12-13 DIAGNOSIS — M54.12 LEFT CERVICAL RADICULOPATHY: Primary | ICD-10-CM

## 2021-12-13 PROCEDURE — 3008F BODY MASS INDEX DOCD: CPT | Performed by: FAMILY MEDICINE

## 2021-12-13 PROCEDURE — 99213 OFFICE O/P EST LOW 20 MIN: CPT | Performed by: FAMILY MEDICINE

## 2021-12-13 PROCEDURE — 1036F TOBACCO NON-USER: CPT | Performed by: FAMILY MEDICINE

## 2021-12-13 PROCEDURE — 3725F SCREEN DEPRESSION PERFORMED: CPT | Performed by: FAMILY MEDICINE

## 2021-12-14 DIAGNOSIS — M54.12 LEFT CERVICAL RADICULOPATHY: Primary | ICD-10-CM

## 2021-12-14 RX ORDER — MELOXICAM 15 MG/1
15 TABLET ORAL DAILY
Qty: 30 TABLET | Refills: 5 | Status: SHIPPED | OUTPATIENT
Start: 2021-12-14

## 2022-01-13 ENCOUNTER — OFFICE VISIT (OUTPATIENT)
Dept: FAMILY MEDICINE CLINIC | Facility: CLINIC | Age: 47
End: 2022-01-13
Payer: COMMERCIAL

## 2022-01-13 VITALS
OXYGEN SATURATION: 98 % | HEART RATE: 86 BPM | BODY MASS INDEX: 29.54 KG/M2 | DIASTOLIC BLOOD PRESSURE: 80 MMHG | WEIGHT: 211 LBS | HEIGHT: 71 IN | SYSTOLIC BLOOD PRESSURE: 140 MMHG

## 2022-01-13 DIAGNOSIS — M54.12 LEFT CERVICAL RADICULOPATHY: Primary | ICD-10-CM

## 2022-01-13 DIAGNOSIS — M54.2 NECK PAIN: ICD-10-CM

## 2022-01-13 DIAGNOSIS — R20.2 NUMBNESS AND TINGLING IN LEFT HAND: ICD-10-CM

## 2022-01-13 DIAGNOSIS — R20.0 NUMBNESS AND TINGLING IN LEFT HAND: ICD-10-CM

## 2022-01-13 PROCEDURE — 99213 OFFICE O/P EST LOW 20 MIN: CPT | Performed by: FAMILY MEDICINE

## 2022-01-13 PROCEDURE — 3008F BODY MASS INDEX DOCD: CPT | Performed by: FAMILY MEDICINE

## 2022-01-13 PROCEDURE — 1036F TOBACCO NON-USER: CPT | Performed by: FAMILY MEDICINE

## 2022-01-13 NOTE — ASSESSMENT & PLAN NOTE
Patient cervical radiculopathy neck trapezius shoulder and upper extremity pain have been improving gradually with physical therapy intervention now  He continued with meloxicam and notes some improvement overall    I recommend continuation of therapy and a home exercise program moving forward with stretching and strengthening to avoid future injury he can continue meloxicam as needed for an additional 2 weeks and then stop this

## 2022-01-13 NOTE — ASSESSMENT & PLAN NOTE
Neck pain improving with physical therapy continue with current treatment plan work on home exercise plan

## 2022-01-13 NOTE — PROGRESS NOTES
Assessment/Plan:       Problem List Items Addressed This Visit        Nervous and Auditory    Left cervical radiculopathy - Primary     Patient cervical radiculopathy neck trapezius shoulder and upper extremity pain have been improving gradually with physical therapy intervention now  He continued with meloxicam and notes some improvement overall  I recommend continuation of therapy and a home exercise program moving forward with stretching and strengthening to avoid future injury he can continue meloxicam as needed for an additional 2 weeks and then stop this            Other    Neck pain     Neck pain improving with physical therapy continue with current treatment plan work on home exercise plan         Numbness and tingling in left hand     Most likely carpal tunnel causing tingling and numbness into the hand in addition to the cervical radiculopathy as he moves his neck in different positions with forward flexion it relieves the symptoms extension upward causes increased symptoms but overall symptoms have improved with his physical therapy regimen and home exercise program   He is also working on stretching his hand and wrist and I recommend that he try taking breaks but his job causes a repetitive motion problem for him  Ideally changing job position and changing work with the hands will improve this                 Subjective:      Patient ID: Rachel Bowling is a 55 y o  male  Patient presents for follow-up for neck and upper trapezius shoulder pain left side      The following portions of the patient's history were reviewed and updated as appropriate: allergies, current medications, past family history, past medical history, past social history, past surgical history and problem list     Review of Systems   Constitutional: Negative for chills, fatigue and fever  HENT: Negative for congestion, nosebleeds, rhinorrhea, sinus pressure and sore throat  Eyes: Negative for discharge and redness  Respiratory: Negative for cough and shortness of breath  Cardiovascular: Negative for chest pain, palpitations and leg swelling  Gastrointestinal: Negative for abdominal pain, blood in stool and nausea  Endocrine: Negative for cold intolerance, heat intolerance and polyuria  Genitourinary: Negative for dysuria and frequency  Musculoskeletal: Positive for arthralgias, myalgias and neck pain  Negative for back pain  Skin: Negative for rash  Neurological: Negative for dizziness, weakness and headaches  Hematological: Negative for adenopathy  Psychiatric/Behavioral: Negative for behavioral problems and sleep disturbance  The patient is not nervous/anxious  Objective:      /80 (BP Location: Left arm, Patient Position: Sitting)   Pulse 86   Ht 5' 11" (1 803 m)   Wt 95 7 kg (211 lb)   SpO2 98%   BMI 29 43 kg/m²        Physical Exam  Vitals and nursing note reviewed  Constitutional:       Appearance: He is well-developed  HENT:      Head: Normocephalic and atraumatic  Right Ear: External ear normal       Left Ear: External ear normal       Nose: Nose normal    Eyes:      General: No scleral icterus  Conjunctiva/sclera: Conjunctivae normal       Pupils: Pupils are equal, round, and reactive to light  Neck:      Thyroid: No thyromegaly  Vascular: No JVD  Cardiovascular:      Rate and Rhythm: Normal rate and regular rhythm  Heart sounds: Normal heart sounds  No murmur heard  Pulmonary:      Effort: Pulmonary effort is normal       Breath sounds: Normal breath sounds  No wheezing or rales  Chest:      Chest wall: No tenderness  Abdominal:      General: Bowel sounds are normal  There is no distension  Palpations: Abdomen is soft  There is no mass  Tenderness: There is no abdominal tenderness  There is no guarding or rebound  Musculoskeletal:         General: No tenderness or deformity  Normal range of motion        Cervical back: Normal range of motion and neck supple  Comments: Improvement in range of motion of neck on rotation and side bending additionally less pain over trapezius left side and shoulder with increased range of motion at left upper extremity with abduction internal and external rotation now   Lymphadenopathy:      Cervical: No cervical adenopathy  Skin:     General: Skin is warm and dry  Findings: No erythema or rash  Neurological:      Mental Status: He is alert and oriented to person, place, and time  Cranial Nerves: No cranial nerve deficit  Deep Tendon Reflexes: Reflexes are normal and symmetric  Reflexes normal    Psychiatric:         Behavior: Behavior normal          Thought Content:  Thought content normal          Judgment: Judgment normal           Data:    Laboratory Results:   Radiology/Other Diagnostic Testing Results:      Lab Results   Component Value Date    WBC 5 26 09/25/2021    HGB 16 6 09/25/2021    HCT 50 3 (H) 09/25/2021    MCV 90 09/25/2021     09/25/2021     Lab Results   Component Value Date    K 4 2 09/25/2021     09/25/2021    CO2 34 (H) 09/25/2021    BUN 17 09/25/2021    CREATININE 1 02 09/25/2021    GLUF 98 09/25/2021    CALCIUM 8 9 09/25/2021    AST 14 09/25/2021    ALT 38 09/25/2021    ALKPHOS 69 09/25/2021    EGFR 88 09/25/2021     Lab Results   Component Value Date    CHOLESTEROL 200 09/25/2021    CHOLESTEROL 187 09/07/2019     Lab Results   Component Value Date    HDL 37 (L) 09/25/2021    HDL 35 (L) 09/07/2019     Lab Results   Component Value Date    LDLCALC 133 (H) 09/25/2021    LDLCALC 123 (H) 09/07/2019     Lab Results   Component Value Date    TRIG 150 09/25/2021    TRIG 145 09/07/2019     No results found for: [place], [place]  Lab Results   Component Value Date    NDZ8ZDRRAFMI 6 350 (H) 11/05/2021     No results found for: HGBA1C  Lab Results   Component Value Date    PSA 0 6 09/25/2021       Lizetet Perez DO

## 2022-01-13 NOTE — ASSESSMENT & PLAN NOTE
Most likely carpal tunnel causing tingling and numbness into the hand in addition to the cervical radiculopathy as he moves his neck in different positions with forward flexion it relieves the symptoms extension upward causes increased symptoms but overall symptoms have improved with his physical therapy regimen and home exercise program   He is also working on stretching his hand and wrist and I recommend that he try taking breaks but his job causes a repetitive motion problem for him    Ideally changing job position and changing work with the hands will improve this

## 2022-01-14 DIAGNOSIS — E03.8 OTHER SPECIFIED HYPOTHYROIDISM: ICD-10-CM

## 2022-01-14 RX ORDER — LEVOTHYROXINE SODIUM 0.05 MG/1
50 TABLET ORAL
Qty: 90 TABLET | Refills: 0 | Status: SHIPPED | OUTPATIENT
Start: 2022-01-14 | End: 2022-05-11 | Stop reason: DRUGHIGH

## 2022-04-23 ENCOUNTER — APPOINTMENT (OUTPATIENT)
Dept: LAB | Facility: CLINIC | Age: 47
End: 2022-04-23
Payer: COMMERCIAL

## 2022-04-23 DIAGNOSIS — E05.00 TOXIC DIFFUSE GOITER WITH PRETIBIAL MYXEDEMA: ICD-10-CM

## 2022-04-23 DIAGNOSIS — E03.8 TOXIC DIFFUSE GOITER WITH PRETIBIAL MYXEDEMA: ICD-10-CM

## 2022-04-23 LAB
T4 FREE SERPL-MCNC: 0.96 NG/DL (ref 0.76–1.46)
TSH SERPL DL<=0.05 MIU/L-ACNC: 9.83 UIU/ML (ref 0.45–4.5)

## 2022-04-23 PROCEDURE — 84443 ASSAY THYROID STIM HORMONE: CPT

## 2022-04-23 PROCEDURE — 36415 COLL VENOUS BLD VENIPUNCTURE: CPT

## 2022-04-23 PROCEDURE — 84439 ASSAY OF FREE THYROXINE: CPT

## 2022-04-29 ENCOUNTER — OFFICE VISIT (OUTPATIENT)
Dept: FAMILY MEDICINE CLINIC | Facility: CLINIC | Age: 47
End: 2022-04-29
Payer: COMMERCIAL

## 2022-04-29 VITALS
TEMPERATURE: 98.7 F | WEIGHT: 211 LBS | BODY MASS INDEX: 29.54 KG/M2 | HEIGHT: 71 IN | HEART RATE: 96 BPM | SYSTOLIC BLOOD PRESSURE: 120 MMHG | OXYGEN SATURATION: 97 % | DIASTOLIC BLOOD PRESSURE: 82 MMHG

## 2022-04-29 DIAGNOSIS — E78.2 MIXED HYPERLIPIDEMIA: ICD-10-CM

## 2022-04-29 DIAGNOSIS — M54.12 LEFT CERVICAL RADICULOPATHY: ICD-10-CM

## 2022-04-29 DIAGNOSIS — E03.8 OTHER SPECIFIED HYPOTHYROIDISM: ICD-10-CM

## 2022-04-29 DIAGNOSIS — Z12.11 SCREENING FOR COLON CANCER: Primary | ICD-10-CM

## 2022-04-29 PROCEDURE — 1036F TOBACCO NON-USER: CPT | Performed by: FAMILY MEDICINE

## 2022-04-29 PROCEDURE — 3008F BODY MASS INDEX DOCD: CPT | Performed by: FAMILY MEDICINE

## 2022-04-29 PROCEDURE — 99214 OFFICE O/P EST MOD 30 MIN: CPT | Performed by: FAMILY MEDICINE

## 2022-04-29 NOTE — ASSESSMENT & PLAN NOTE
Patient notes improvement overall without symptomatology at this point after physical therapy with traction exercises and he continues to do home exercise program now keep in the neck muscles strong

## 2022-04-29 NOTE — ASSESSMENT & PLAN NOTE
Patient doing well overall had his thyroid lab work done recently and came in to re-evaluate this and go over the numbers  His TSH is close to 9 and his T4 is in relatively good range    We talked about increasing the dosage of his levothyroxine up to 75 mcg now and can repeat his laboratory work in the fall

## 2022-04-29 NOTE — ASSESSMENT & PLAN NOTE
Laboratory work will be reviewed and repeated in 6 months he is going to watch diet avoiding saturated fats and concentrated sweets

## 2022-04-29 NOTE — PROGRESS NOTES
Assessment/Plan:       Problem List Items Addressed This Visit        Endocrine    Other specified hypothyroidism     Patient doing well overall had his thyroid lab work done recently and came in to re-evaluate this and go over the numbers  His TSH is close to 9 and his T4 is in relatively good range  We talked about increasing the dosage of his levothyroxine up to 75 mcg now and can repeat his laboratory work in the fall         Relevant Orders    Comprehensive metabolic panel    Lipid panel    TSH, 3rd generation with Free T4 reflex    PSA, total and free    CBC and differential       Nervous and Auditory    Left cervical radiculopathy     Patient notes improvement overall without symptomatology at this point after physical therapy with traction exercises and he continues to do home exercise program now keep in the neck muscles strong         Relevant Orders    Comprehensive metabolic panel    Lipid panel    TSH, 3rd generation with Free T4 reflex    PSA, total and free    CBC and differential       Other    Mixed hyperlipidemia     Laboratory work will be reviewed and repeated in 6 months he is going to watch diet avoiding saturated fats and concentrated sweets         Relevant Orders    Comprehensive metabolic panel    Lipid panel    TSH, 3rd generation with Free T4 reflex    PSA, total and free    CBC and differential      Other Visit Diagnoses     Screening for colon cancer    -  Primary    Relevant Orders    Cologuard    Comprehensive metabolic panel    Lipid panel    TSH, 3rd generation with Free T4 reflex    PSA, total and free    CBC and differential            Subjective:      Patient ID: Barry Zapata is a 52 y o  male      Follow-up evaluation today for laboratory work regarding the thyroid      The following portions of the patient's history were reviewed and updated as appropriate: allergies, current medications, past family history, past medical history, past social history, past surgical history and problem list     Review of Systems   Constitutional: Negative for chills, fatigue and fever  HENT: Negative for congestion, nosebleeds, rhinorrhea, sinus pressure and sore throat  Eyes: Negative for discharge and redness  Respiratory: Negative for cough and shortness of breath  Cardiovascular: Negative for chest pain, palpitations and leg swelling  Gastrointestinal: Negative for abdominal pain, blood in stool and nausea  Endocrine: Negative for cold intolerance, heat intolerance and polyuria  Genitourinary: Negative for dysuria and frequency  Musculoskeletal: Negative for arthralgias, back pain and myalgias  Skin: Negative for rash  Neurological: Negative for dizziness, weakness and headaches  Hematological: Negative for adenopathy  Psychiatric/Behavioral: Negative for behavioral problems and sleep disturbance  The patient is not nervous/anxious  Objective:      /82   Pulse 96   Temp 98 7 °F (37 1 °C)   Ht 5' 11" (1 803 m)   Wt 95 7 kg (211 lb)   SpO2 97%   BMI 29 43 kg/m²        Physical Exam  Vitals and nursing note reviewed  Constitutional:       Appearance: Normal appearance  He is well-developed and normal weight  HENT:      Head: Normocephalic and atraumatic  Right Ear: Tympanic membrane, ear canal and external ear normal       Left Ear: Tympanic membrane, ear canal and external ear normal       Nose: Nose normal       Mouth/Throat:      Mouth: Mucous membranes are moist       Pharynx: Oropharynx is clear  Eyes:      General: No scleral icterus  Extraocular Movements: Extraocular movements intact  Conjunctiva/sclera: Conjunctivae normal       Pupils: Pupils are equal, round, and reactive to light  Neck:      Thyroid: No thyromegaly  Vascular: No JVD  Cardiovascular:      Rate and Rhythm: Normal rate and regular rhythm  Pulses: Normal pulses  Heart sounds: Normal heart sounds  No murmur heard        Pulmonary:      Effort: Pulmonary effort is normal       Breath sounds: Normal breath sounds  No wheezing or rales  Chest:      Chest wall: No tenderness  Abdominal:      General: Bowel sounds are normal  There is no distension  Palpations: Abdomen is soft  There is no mass  Tenderness: There is no abdominal tenderness  There is no guarding or rebound  Musculoskeletal:         General: No tenderness or deformity  Normal range of motion  Cervical back: Normal range of motion and neck supple  Lymphadenopathy:      Cervical: No cervical adenopathy  Skin:     General: Skin is warm and dry  Capillary Refill: Capillary refill takes less than 2 seconds  Findings: No erythema or rash  Neurological:      General: No focal deficit present  Mental Status: He is alert and oriented to person, place, and time  Mental status is at baseline  Cranial Nerves: No cranial nerve deficit  Deep Tendon Reflexes: Reflexes are normal and symmetric  Reflexes normal    Psychiatric:         Mood and Affect: Mood normal          Behavior: Behavior normal          Thought Content: Thought content normal          Judgment: Judgment normal           Data:    Laboratory Results: I have personally reviewed the pertinent laboratory results/reports   Radiology/Other Diagnostic Testing Results: I have personally reviewed pertinent reports         Lab Results   Component Value Date    WBC 5 26 09/25/2021    HGB 16 6 09/25/2021    HCT 50 3 (H) 09/25/2021    MCV 90 09/25/2021     09/25/2021     Lab Results   Component Value Date    K 4 2 09/25/2021     09/25/2021    CO2 34 (H) 09/25/2021    BUN 17 09/25/2021    CREATININE 1 02 09/25/2021    GLUF 98 09/25/2021    CALCIUM 8 9 09/25/2021    AST 14 09/25/2021    ALT 38 09/25/2021    ALKPHOS 69 09/25/2021    EGFR 88 09/25/2021     Lab Results   Component Value Date    CHOLESTEROL 200 09/25/2021    CHOLESTEROL 187 09/07/2019     Lab Results   Component Value Date    HDL 37 (L) 09/25/2021    HDL 35 (L) 09/07/2019     Lab Results   Component Value Date    LDLCALC 133 (H) 09/25/2021    LDLCALC 123 (H) 09/07/2019     Lab Results   Component Value Date    TRIG 150 09/25/2021    TRIG 145 09/07/2019     No results found for: [place], [place]  Lab Results   Component Value Date    QFZ2RBTSEKLW 9 830 (H) 04/23/2022     No results found for: HGBA1C  Lab Results   Component Value Date    PSA 0 6 09/25/2021       Wallace Victoria DO

## 2022-05-11 DIAGNOSIS — E03.8 OTHER SPECIFIED HYPOTHYROIDISM: ICD-10-CM

## 2022-05-11 RX ORDER — LEVOTHYROXINE SODIUM 0.07 MG/1
75 TABLET ORAL DAILY
Qty: 90 TABLET | Refills: 0 | Status: SHIPPED | OUTPATIENT
Start: 2022-05-11 | End: 2022-05-11 | Stop reason: SDUPTHER

## 2022-05-11 RX ORDER — LEVOTHYROXINE SODIUM 0.07 MG/1
75 TABLET ORAL DAILY
Qty: 90 TABLET | Refills: 0 | Status: SHIPPED | OUTPATIENT
Start: 2022-05-11 | End: 2022-08-10

## 2022-08-10 DIAGNOSIS — E03.8 OTHER SPECIFIED HYPOTHYROIDISM: ICD-10-CM

## 2022-08-10 RX ORDER — LEVOTHYROXINE SODIUM 0.07 MG/1
75 TABLET ORAL DAILY
Qty: 90 TABLET | Refills: 0 | Status: SHIPPED | OUTPATIENT
Start: 2022-08-10

## 2022-09-03 ENCOUNTER — APPOINTMENT (OUTPATIENT)
Dept: LAB | Facility: CLINIC | Age: 47
End: 2022-09-03
Payer: COMMERCIAL

## 2022-09-03 DIAGNOSIS — Z12.11 SCREEN FOR COLON CANCER: ICD-10-CM

## 2022-09-03 DIAGNOSIS — E05.00 TOXIC DIFFUSE GOITER WITH PRETIBIAL MYXEDEMA: ICD-10-CM

## 2022-09-03 DIAGNOSIS — M54.12 BRACHIAL NEURITIS: ICD-10-CM

## 2022-09-03 DIAGNOSIS — E03.8 TOXIC DIFFUSE GOITER WITH PRETIBIAL MYXEDEMA: ICD-10-CM

## 2022-09-03 DIAGNOSIS — E78.2 MIXED HYPERLIPIDEMIA: ICD-10-CM

## 2022-09-03 LAB
ALBUMIN SERPL BCP-MCNC: 3.7 G/DL (ref 3.5–5)
ALP SERPL-CCNC: 74 U/L (ref 46–116)
ALT SERPL W P-5'-P-CCNC: 29 U/L (ref 12–78)
ANION GAP SERPL CALCULATED.3IONS-SCNC: 9 MMOL/L (ref 4–13)
AST SERPL W P-5'-P-CCNC: 13 U/L (ref 5–45)
BASOPHILS # BLD AUTO: 0.03 THOUSANDS/ΜL (ref 0–0.1)
BASOPHILS NFR BLD AUTO: 1 % (ref 0–1)
BILIRUB SERPL-MCNC: 0.76 MG/DL (ref 0.2–1)
BUN SERPL-MCNC: 17 MG/DL (ref 5–25)
CALCIUM SERPL-MCNC: 8.9 MG/DL (ref 8.3–10.1)
CHLORIDE SERPL-SCNC: 106 MMOL/L (ref 96–108)
CHOLEST SERPL-MCNC: 184 MG/DL
CO2 SERPL-SCNC: 25 MMOL/L (ref 21–32)
CREAT SERPL-MCNC: 1.18 MG/DL (ref 0.6–1.3)
EOSINOPHIL # BLD AUTO: 0.08 THOUSAND/ΜL (ref 0–0.61)
EOSINOPHIL NFR BLD AUTO: 2 % (ref 0–6)
ERYTHROCYTE [DISTWIDTH] IN BLOOD BY AUTOMATED COUNT: 12.8 % (ref 11.6–15.1)
GFR SERPL CREATININE-BSD FRML MDRD: 73 ML/MIN/1.73SQ M
GLUCOSE P FAST SERPL-MCNC: 98 MG/DL (ref 65–99)
HCT VFR BLD AUTO: 48.3 % (ref 36.5–49.3)
HDLC SERPL-MCNC: 36 MG/DL
HGB BLD-MCNC: 16.2 G/DL (ref 12–17)
IMM GRANULOCYTES # BLD AUTO: 0.01 THOUSAND/UL (ref 0–0.2)
IMM GRANULOCYTES NFR BLD AUTO: 0 % (ref 0–2)
LDLC SERPL CALC-MCNC: 120 MG/DL (ref 0–100)
LYMPHOCYTES # BLD AUTO: 1.33 THOUSANDS/ΜL (ref 0.6–4.47)
LYMPHOCYTES NFR BLD AUTO: 32 % (ref 14–44)
MCH RBC QN AUTO: 29.5 PG (ref 26.8–34.3)
MCHC RBC AUTO-ENTMCNC: 33.5 G/DL (ref 31.4–37.4)
MCV RBC AUTO: 88 FL (ref 82–98)
MONOCYTES # BLD AUTO: 0.48 THOUSAND/ΜL (ref 0.17–1.22)
MONOCYTES NFR BLD AUTO: 12 % (ref 4–12)
NEUTROPHILS # BLD AUTO: 2.18 THOUSANDS/ΜL (ref 1.85–7.62)
NEUTS SEG NFR BLD AUTO: 53 % (ref 43–75)
NONHDLC SERPL-MCNC: 148 MG/DL
NRBC BLD AUTO-RTO: 0 /100 WBCS
PLATELET # BLD AUTO: 231 THOUSANDS/UL (ref 149–390)
PMV BLD AUTO: 9 FL (ref 8.9–12.7)
POTASSIUM SERPL-SCNC: 4 MMOL/L (ref 3.5–5.3)
PROT SERPL-MCNC: 6.9 G/DL (ref 6.4–8.4)
RBC # BLD AUTO: 5.49 MILLION/UL (ref 3.88–5.62)
SODIUM SERPL-SCNC: 140 MMOL/L (ref 135–147)
TRIGL SERPL-MCNC: 142 MG/DL
TSH SERPL DL<=0.05 MIU/L-ACNC: 6.4 UIU/ML (ref 0.45–4.5)
WBC # BLD AUTO: 4.11 THOUSAND/UL (ref 4.31–10.16)

## 2022-09-03 PROCEDURE — 36415 COLL VENOUS BLD VENIPUNCTURE: CPT

## 2022-09-03 PROCEDURE — 84154 ASSAY OF PSA FREE: CPT

## 2022-09-03 PROCEDURE — 80053 COMPREHEN METABOLIC PANEL: CPT

## 2022-09-03 PROCEDURE — 80061 LIPID PANEL: CPT

## 2022-09-03 PROCEDURE — 85025 COMPLETE CBC W/AUTO DIFF WBC: CPT

## 2022-09-03 PROCEDURE — 84153 ASSAY OF PSA TOTAL: CPT

## 2022-09-03 PROCEDURE — 84443 ASSAY THYROID STIM HORMONE: CPT

## 2022-09-06 LAB
PSA FREE MFR SERPL: 46.7 %
PSA FREE SERPL-MCNC: 0.28 NG/ML
PSA SERPL-MCNC: 0.6 NG/ML (ref 0–4)

## 2022-09-09 ENCOUNTER — OFFICE VISIT (OUTPATIENT)
Dept: FAMILY MEDICINE CLINIC | Facility: CLINIC | Age: 47
End: 2022-09-09
Payer: COMMERCIAL

## 2022-09-09 VITALS
TEMPERATURE: 98.4 F | BODY MASS INDEX: 28.92 KG/M2 | RESPIRATION RATE: 18 BRPM | SYSTOLIC BLOOD PRESSURE: 120 MMHG | DIASTOLIC BLOOD PRESSURE: 80 MMHG | HEIGHT: 71 IN | OXYGEN SATURATION: 96 % | HEART RATE: 88 BPM | WEIGHT: 206.6 LBS

## 2022-09-09 DIAGNOSIS — Z00.00 ANNUAL PHYSICAL EXAM: Primary | ICD-10-CM

## 2022-09-09 DIAGNOSIS — Z12.11 SCREENING FOR COLON CANCER: ICD-10-CM

## 2022-09-09 PROCEDURE — 99396 PREV VISIT EST AGE 40-64: CPT | Performed by: FAMILY MEDICINE

## 2022-09-09 NOTE — PROGRESS NOTES
[facility]    NAME: Brittani Estevez  AGE: 52 y o  SEX: male  : 1975     DATE: 2022     Assessment and Plan:     Problem List Items Addressed This Visit        Other    Annual physical exam - Primary      Other Visit Diagnoses     Screening for colon cancer        Relevant Orders    Cologuard          Immunizations and preventive care screenings were discussed with patient today  Appropriate education was printed on patient's after visit summary  Counseling:  Alcohol/drug use: discussed moderation in alcohol intake, the recommendations for healthy alcohol use, and avoidance of illicit drug use  Dental Health: discussed importance of regular tooth brushing, flossing, and dental visits  Injury prevention: discussed safety/seat belts, safety helmets, smoke detectors, carbon dioxide detectors, and smoking near bedding or upholstery  Sexual health: discussed sexually transmitted diseases, partner selection, use of condoms, avoidance of unintended pregnancy, and contraceptive alternatives  · Exercise: the importance of regular exercise/physical activity was discussed  Recommend exercise 3-5 times per week for at least 30 minutes  Return in about 1 year (around 2023) for Annual physical      Chief Complaint:     Chief Complaint   Patient presents with    Physical Exam      History of Present Illness:     Adult Annual Physical   Patient here for a comprehensive physical exam  The patient reports no problems  Diet and Physical Activity  · Diet/Nutrition: well balanced diet  · Exercise: no formal exercise  Depression Screening  PHQ-2/9 Depression Screening         General Health  · Sleep: sleeps well  · Hearing: normal - bilateral   · Vision: no vision problems and wears glasses  · Dental: regular dental visits          Health  · Symptoms include: none     Review of Systems:     Review of Systems Constitutional: Negative for chills, fatigue and fever  HENT: Negative for congestion, nosebleeds, rhinorrhea, sinus pressure and sore throat  Eyes: Negative for discharge and redness  Respiratory: Negative for cough and shortness of breath  Cardiovascular: Negative for chest pain, palpitations and leg swelling  Gastrointestinal: Negative for abdominal pain, blood in stool and nausea  Endocrine: Negative for cold intolerance, heat intolerance and polyuria  Genitourinary: Negative for dysuria and frequency  Musculoskeletal: Negative for arthralgias, back pain and myalgias  Skin: Negative for rash  Neurological: Negative for dizziness, weakness and headaches  Hematological: Negative for adenopathy  Psychiatric/Behavioral: Negative for behavioral problems and sleep disturbance  The patient is not nervous/anxious  Past Medical History:     History reviewed  No pertinent past medical history  Past Surgical History:     Past Surgical History:   Procedure Laterality Date    SHOULDER SURGERY        Family History:     History reviewed  No pertinent family history     Social History:     Social History     Socioeconomic History    Marital status: /Civil Union     Spouse name: None    Number of children: None    Years of education: None    Highest education level: None   Occupational History    None   Tobacco Use    Smoking status: Never Smoker    Smokeless tobacco: Never Used   Vaping Use    Vaping Use: Never used   Substance and Sexual Activity    Alcohol use: No    Drug use: No    Sexual activity: None   Other Topics Concern    None   Social History Narrative    None     Social Determinants of Health     Financial Resource Strain: Not on file   Food Insecurity: Not on file   Transportation Needs: Not on file   Physical Activity: Not on file   Stress: Not on file   Social Connections: Not on file   Intimate Partner Violence: Not on file   Housing Stability: Not on file Current Medications:     Current Outpatient Medications   Medication Sig Dispense Refill    levothyroxine 75 mcg tablet TAKE 1 TABLET (75 MCG TOTAL) BY MOUTH IN THE MORNING  90 tablet 0    meloxicam (Mobic) 15 mg tablet Take 1 tablet (15 mg total) by mouth daily (Patient not taking: Reported on 9/9/2022) 30 tablet 5     No current facility-administered medications for this visit  Allergies: Allergies   Allergen Reactions    Contrast [Iodinated Diagnostic Agents] Hives    Sulfa Antibiotics Hives      Physical Exam:     /80 (BP Location: Left arm, Patient Position: Sitting)   Pulse 88   Temp 98 4 °F (36 9 °C)   Resp 18   Ht 5' 11" (1 803 m)   Wt 93 7 kg (206 lb 9 6 oz)   SpO2 96%   BMI 28 81 kg/m²     Physical Exam  Vitals and nursing note reviewed  Constitutional:       Appearance: Normal appearance  He is well-developed and normal weight  HENT:      Head: Normocephalic and atraumatic  Right Ear: Tympanic membrane, ear canal and external ear normal       Left Ear: Tympanic membrane, ear canal and external ear normal       Nose: Nose normal       Mouth/Throat:      Mouth: Mucous membranes are moist       Pharynx: Oropharynx is clear  Eyes:      Extraocular Movements: Extraocular movements intact  Conjunctiva/sclera: Conjunctivae normal       Pupils: Pupils are equal, round, and reactive to light  Cardiovascular:      Rate and Rhythm: Normal rate and regular rhythm  Pulses: Normal pulses  Heart sounds: Normal heart sounds  No murmur heard  Pulmonary:      Effort: Pulmonary effort is normal  No respiratory distress  Breath sounds: Normal breath sounds  Abdominal:      Palpations: Abdomen is soft  Tenderness: There is no abdominal tenderness  Musculoskeletal:         General: Normal range of motion  Cervical back: Normal range of motion and neck supple  Skin:     General: Skin is warm and dry        Capillary Refill: Capillary refill takes less than 2 seconds  Neurological:      General: No focal deficit present  Mental Status: He is alert  Mental status is at baseline  Psychiatric:         Mood and Affect: Mood normal          Behavior: Behavior normal          Thought Content:  Thought content normal          Judgment: Judgment normal           DO ROBIN Ingram 99

## 2022-11-10 DIAGNOSIS — E03.8 OTHER SPECIFIED HYPOTHYROIDISM: ICD-10-CM

## 2022-11-10 RX ORDER — LEVOTHYROXINE SODIUM 0.07 MG/1
75 TABLET ORAL DAILY
Qty: 90 TABLET | Refills: 0 | Status: SHIPPED | OUTPATIENT
Start: 2022-11-10

## 2022-12-09 LAB — COLOGUARD RESULT REPORTABLE: NEGATIVE

## 2023-02-06 DIAGNOSIS — E03.8 OTHER SPECIFIED HYPOTHYROIDISM: ICD-10-CM

## 2023-02-06 RX ORDER — LEVOTHYROXINE SODIUM 0.07 MG/1
75 TABLET ORAL DAILY
Qty: 90 TABLET | Refills: 0 | Status: SHIPPED | OUTPATIENT
Start: 2023-02-06

## 2023-03-28 ENCOUNTER — OFFICE VISIT (OUTPATIENT)
Dept: FAMILY MEDICINE CLINIC | Facility: CLINIC | Age: 48
End: 2023-03-28

## 2023-03-28 VITALS
TEMPERATURE: 98 F | SYSTOLIC BLOOD PRESSURE: 118 MMHG | DIASTOLIC BLOOD PRESSURE: 72 MMHG | HEIGHT: 71 IN | BODY MASS INDEX: 29.82 KG/M2 | WEIGHT: 213 LBS | HEART RATE: 88 BPM | OXYGEN SATURATION: 97 %

## 2023-03-28 DIAGNOSIS — M26.609 TMJ DYSFUNCTION: Primary | ICD-10-CM

## 2023-03-28 RX ORDER — NAPROXEN 500 MG/1
500 TABLET ORAL 2 TIMES DAILY WITH MEALS
Qty: 60 TABLET | Refills: 1 | Status: SHIPPED | OUTPATIENT
Start: 2023-03-28

## 2023-03-28 NOTE — PROGRESS NOTES
Assessment/Plan:       Problem List Items Addressed This Visit        Musculoskeletal and Integument    TMJ dysfunction - Primary     TMJ dysfunction left side with pain and tenderness to palpation and opening and closing of the jaw he notes that he had a lot of pain chewing a bagel the other day  On exam his ear canal is more swollen on the left side where the pain is located  Tympanic membranes are bilaterally intact no sign of infection  We will provide naproxen 500 mg twice daily over the next 1 to 2 weeks and patient will stay on a soft diet and if any change or problems develop he will contact me              Subjective:      Patient ID: Monico Santamaria is a 50 y o  male  Patient presents for left ear pain    Earache   There is pain in the left ear  The current episode started in the past 7 days  The problem has been waxing and waning  There has been no fever  Pertinent negatives include no abdominal pain, coughing, headaches, rash, rhinorrhea or sore throat  The following portions of the patient's history were reviewed and updated as appropriate: allergies, current medications, past family history, past medical history, past social history, past surgical history and problem list     Review of Systems   Constitutional: Negative for chills, fatigue and fever  HENT: Positive for ear pain  Negative for congestion, nosebleeds, rhinorrhea, sinus pressure and sore throat  Eyes: Negative for discharge and redness  Respiratory: Negative for cough and shortness of breath  Cardiovascular: Negative for chest pain, palpitations and leg swelling  Gastrointestinal: Negative for abdominal pain, blood in stool and nausea  Endocrine: Negative for cold intolerance, heat intolerance and polyuria  Genitourinary: Negative for dysuria and frequency  Musculoskeletal: Negative for arthralgias, back pain and myalgias  Skin: Negative for rash  Neurological: Negative for dizziness, weakness and headaches  "  Hematological: Negative for adenopathy  Psychiatric/Behavioral: Negative for behavioral problems and sleep disturbance  The patient is not nervous/anxious  Objective:      /72   Pulse 88   Temp 98 °F (36 7 °C)   Ht 5' 11\" (1 803 m)   Wt 96 6 kg (213 lb)   SpO2 97%   BMI 29 71 kg/m²        Physical Exam  Vitals and nursing note reviewed  Constitutional:       Appearance: He is well-developed  HENT:      Head: Normocephalic and atraumatic  Right Ear: External ear normal       Left Ear: External ear normal       Nose: Nose normal    Eyes:      General: No scleral icterus  Conjunctiva/sclera: Conjunctivae normal       Pupils: Pupils are equal, round, and reactive to light  Neck:      Thyroid: No thyromegaly  Vascular: No JVD  Cardiovascular:      Rate and Rhythm: Normal rate and regular rhythm  Heart sounds: Normal heart sounds  No murmur heard  Pulmonary:      Effort: Pulmonary effort is normal       Breath sounds: Normal breath sounds  No wheezing or rales  Chest:      Chest wall: No tenderness  Abdominal:      General: Bowel sounds are normal  There is no distension  Palpations: Abdomen is soft  There is no mass  Tenderness: There is no abdominal tenderness  There is no guarding or rebound  Musculoskeletal:         General: No tenderness or deformity  Normal range of motion  Cervical back: Normal range of motion and neck supple  Comments: TMJ pain left side   Lymphadenopathy:      Cervical: No cervical adenopathy  Skin:     General: Skin is warm and dry  Findings: No erythema or rash  Neurological:      Mental Status: He is alert and oriented to person, place, and time  Cranial Nerves: No cranial nerve deficit  Deep Tendon Reflexes: Reflexes are normal and symmetric  Reflexes normal    Psychiatric:         Behavior: Behavior normal          Thought Content:  Thought content normal          Judgment: Judgment normal       " Data:    Laboratory Results:   Radiology/Other Diagnostic Testing Results:      Lab Results   Component Value Date    WBC 4 11 (L) 09/03/2022    HGB 16 2 09/03/2022    HCT 48 3 09/03/2022    MCV 88 09/03/2022     09/03/2022     Lab Results   Component Value Date    K 4 0 09/03/2022     09/03/2022    CO2 25 09/03/2022    BUN 17 09/03/2022    CREATININE 1 18 09/03/2022    GLUF 98 09/03/2022    CALCIUM 8 9 09/03/2022    AST 13 09/03/2022    ALT 29 09/03/2022    ALKPHOS 74 09/03/2022    EGFR 73 09/03/2022     Lab Results   Component Value Date    CHOLESTEROL 184 09/03/2022    CHOLESTEROL 200 09/25/2021    CHOLESTEROL 187 09/07/2019     Lab Results   Component Value Date    HDL 36 (L) 09/03/2022    HDL 37 (L) 09/25/2021    HDL 35 (L) 09/07/2019     Lab Results   Component Value Date    LDLCALC 120 (H) 09/03/2022    LDLCALC 133 (H) 09/25/2021    LDLCALC 123 (H) 09/07/2019     Lab Results   Component Value Date    TRIG 142 09/03/2022    TRIG 150 09/25/2021    TRIG 145 09/07/2019     No results found for: [place], [place]  Lab Results   Component Value Date    [id number] 6 400 (H) 09/03/2022     No results found for: HGBA1C  Lab Results   Component Value Date    PSA 0 6 09/03/2022       Phong Coyne DO

## 2023-03-28 NOTE — PROGRESS NOTES
BMI Counseling: Body mass index is 29 71 kg/m²   The BMI just above normal range patient will continue with diet and exercise

## 2023-03-28 NOTE — ASSESSMENT & PLAN NOTE
TMJ dysfunction left side with pain and tenderness to palpation and opening and closing of the jaw he notes that he had a lot of pain chewing a bagel the other day  On exam his ear canal is more swollen on the left side where the pain is located  Tympanic membranes are bilaterally intact no sign of infection    We will provide naproxen 500 mg twice daily over the next 1 to 2 weeks and patient will stay on a soft diet and if any change or problems develop he will contact me

## 2023-03-28 NOTE — PATIENT INSTRUCTIONS
Temporomandibular Disorder   WHAT YOU NEED TO KNOW:   Temporomandibular disorder is a condition that causes pain in your jaw  The disorder affects the joint between your temporal bone and your mandible (jawbone)  The muscles and nerves around the joint are also affected  DISCHARGE INSTRUCTIONS:   Medicines:   Pain medicine: You may be given a prescription medicine to decrease pain  Do not wait until the pain is severe before you take this medicine  NSAIDs:  These medicines decrease swelling and pain  You can buy NSAIDs without a doctor's order  Ask your healthcare provider which medicine is right for you, and how much to take  Take as directed  NSAIDs can cause stomach bleeding or kidney problems if not taken correctly  Muscle relaxers  help decrease pain and muscle spasms  Take your medicine as directed  Contact your healthcare provider if you think your medicine is not helping or if you have side effects  Tell your provider if you are allergic to any medicine  Keep a list of the medicines, vitamins, and herbs you take  Include the amounts, and when and why you take them  Bring the list or the pill bottles to follow-up visits  Carry your medicine list with you in case of an emergency  Follow up with your doctor as directed:  Write down your questions so you remember to ask them during your visits  Manage your symptoms:   Eat soft foods: Your healthcare provider may suggest that you eat only soft foods for several days  A dietitian may work with you to find foods that are easier to bite, chew, or swallow  Examples are soup, applesauce, cottage cheese, pudding, yogurt, and soft fruits  Use jaw supporting devices:  Splints may be used to support your jaw or keep your jaw from moving  You may need to wear a mouth guard to keep you from clenching or grinding your teeth while you are sleeping  Use ice and heat:  Ice helps decrease swelling and pain  Ice may also help prevent tissue damage   Use an ice pack, or put crushed ice in a plastic bag  Cover it with a towel and place it on your jaw for 15 to 20 minutes every hour or as directed  After the first 24 to 48 hours, use heat to decrease pain, swelling, and muscle spasms  Apply heat on the area for 20 to 30 minutes every 2 hours for as many days as directed  Use a heating pad, moist warm compress, or a hot water bottle  Go to physical therapy:  A physical therapist teaches you exercises to help improve movement and strength, and to decrease pain in your jaw  A speech therapist may help you with swallowing and speech exercises  Contact your healthcare provider if:   You have a fever  Your splint or mouth guard is loose  You have questions or concerns about your condition or care  Return to the emergency department if:   You have nausea, are vomiting, or cannot keep liquids down  You have pain that does not go away even after you take your pain medicine  You have problems breathing, talking, drinking, eating, or swallowing  Your splint or mouth guard gets damaged or broken  © Copyright [facility] 2022 Information is for End User's use only and may not be sold, redistributed or otherwise used for commercial purposes  The above information is an  only  It is not intended as medical advice for individual conditions or treatments  Talk to your doctor, nurse or pharmacist before following any medical regimen to see if it is safe and effective for you

## 2023-05-11 DIAGNOSIS — E03.8 OTHER SPECIFIED HYPOTHYROIDISM: ICD-10-CM

## 2023-05-11 RX ORDER — LEVOTHYROXINE SODIUM 0.07 MG/1
75 TABLET ORAL DAILY
Qty: 90 TABLET | Refills: 0 | Status: SHIPPED | OUTPATIENT
Start: 2023-05-11

## 2023-08-13 DIAGNOSIS — E03.8 OTHER SPECIFIED HYPOTHYROIDISM: ICD-10-CM

## 2023-08-14 DIAGNOSIS — E03.8 OTHER SPECIFIED HYPOTHYROIDISM: ICD-10-CM

## 2023-08-14 RX ORDER — LEVOTHYROXINE SODIUM 0.07 MG/1
75 TABLET ORAL DAILY
Qty: 90 TABLET | Refills: 0 | OUTPATIENT
Start: 2023-08-14

## 2023-08-14 RX ORDER — LEVOTHYROXINE SODIUM 0.07 MG/1
75 TABLET ORAL DAILY
Qty: 90 TABLET | Refills: 0 | Status: SHIPPED | OUTPATIENT
Start: 2023-08-14

## 2023-08-29 ENCOUNTER — OFFICE VISIT (OUTPATIENT)
Dept: FAMILY MEDICINE CLINIC | Facility: CLINIC | Age: 48
End: 2023-08-29
Payer: COMMERCIAL

## 2023-08-29 VITALS
TEMPERATURE: 98 F | SYSTOLIC BLOOD PRESSURE: 120 MMHG | BODY MASS INDEX: 29.4 KG/M2 | RESPIRATION RATE: 18 BRPM | WEIGHT: 210 LBS | HEART RATE: 93 BPM | DIASTOLIC BLOOD PRESSURE: 80 MMHG | HEIGHT: 71 IN | OXYGEN SATURATION: 97 %

## 2023-08-29 DIAGNOSIS — R20.0 NUMBNESS AND TINGLING IN LEFT HAND: ICD-10-CM

## 2023-08-29 DIAGNOSIS — Z00.00 ANNUAL PHYSICAL EXAM: ICD-10-CM

## 2023-08-29 DIAGNOSIS — R20.2 NUMBNESS AND TINGLING IN LEFT HAND: ICD-10-CM

## 2023-08-29 DIAGNOSIS — M54.12 LEFT CERVICAL RADICULOPATHY: Primary | ICD-10-CM

## 2023-08-29 PROCEDURE — 99213 OFFICE O/P EST LOW 20 MIN: CPT | Performed by: FAMILY MEDICINE

## 2023-08-29 RX ORDER — PREDNISONE 10 MG/1
TABLET ORAL
Qty: 30 TABLET | Refills: 1 | Status: SHIPPED | OUTPATIENT
Start: 2023-08-29

## 2023-08-29 NOTE — PROGRESS NOTES
Assessment/Plan:       Problem List Items Addressed This Visit        Nervous and Auditory    Left cervical radiculopathy - Primary     Flared up now pain at C7-8 and T1-2         Relevant Medications    predniSONE 10 mg tablet       Other    Annual physical exam    Relevant Orders    CBC and differential    Comprehensive metabolic panel    Lipid panel    TSH, 3rd generation with Free T4 reflex    PSA, total and free    Numbness and tingling in left hand     Start Prednisone tapering dose now. Relevant Medications    predniSONE 10 mg tablet         Subjective:      Patient ID: Jose M Jerry is a 50 y.o. male. Patient presents today for flareup of his radicular pain from his neck on the left side radiating to his arm and hand with new onset of trauma      The following portions of the patient's history were reviewed and updated as appropriate: allergies, current medications, past family history, past medical history, past social history, past surgical history and problem list.    Review of Systems   Constitutional: Negative for chills, fatigue and fever. HENT: Negative for congestion, nosebleeds, rhinorrhea, sinus pressure and sore throat. Eyes: Negative for discharge and redness. Respiratory: Negative for cough and shortness of breath. Cardiovascular: Negative for chest pain, palpitations and leg swelling. Gastrointestinal: Negative for abdominal pain, blood in stool and nausea. Endocrine: Negative for cold intolerance, heat intolerance and polyuria. Genitourinary: Negative for dysuria and frequency. Musculoskeletal: Positive for neck pain. Negative for arthralgias, back pain and myalgias. Skin: Negative for rash. Neurological: Negative for dizziness, weakness and headaches. Hematological: Negative for adenopathy. Psychiatric/Behavioral: Negative for behavioral problems and sleep disturbance. The patient is not nervous/anxious.           Objective:      /80 (BP Location: Right arm, Patient Position: Sitting, Cuff Size: Large)   Pulse 93   Temp 98 °F (36.7 °C) (Tympanic)   Resp 18   Ht 5' 11" (1.803 m)   Wt 95.3 kg (210 lb)   SpO2 97%   BMI 29.29 kg/m²        Physical Exam  Vitals and nursing note reviewed. Constitutional:       Appearance: He is well-developed. HENT:      Head: Normocephalic and atraumatic. Right Ear: External ear normal.      Left Ear: External ear normal.      Nose: Nose normal.   Eyes:      General: No scleral icterus. Conjunctiva/sclera: Conjunctivae normal.      Pupils: Pupils are equal, round, and reactive to light. Neck:      Thyroid: No thyromegaly. Vascular: No JVD. Cardiovascular:      Rate and Rhythm: Normal rate and regular rhythm. Heart sounds: Normal heart sounds. No murmur heard. Pulmonary:      Effort: Pulmonary effort is normal.      Breath sounds: Normal breath sounds. No wheezing or rales. Chest:      Chest wall: No tenderness. Abdominal:      General: Bowel sounds are normal. There is no distension. Palpations: Abdomen is soft. There is no mass. Tenderness: There is no abdominal tenderness. There is no guarding or rebound. Musculoskeletal:         General: No tenderness or deformity. Normal range of motion. Cervical back: Normal range of motion and neck supple. Comments: Pain left side at C7-8 and T1 to region on deep palpation disc triggers pain and radicular symptoms into his left hand   Lymphadenopathy:      Cervical: No cervical adenopathy. Skin:     General: Skin is warm and dry. Findings: No erythema or rash. Neurological:      Mental Status: He is alert and oriented to person, place, and time. Cranial Nerves: No cranial nerve deficit. Deep Tendon Reflexes: Reflexes are normal and symmetric. Reflexes normal.   Psychiatric:         Behavior: Behavior normal.         Thought Content:  Thought content normal.         Judgment: Judgment normal.          Data:    Laboratory Results: I have personally reviewed the pertinent laboratory results/reports   Radiology/Other Diagnostic Testing Results: I have personally reviewed pertinent reports.        Lab Results   Component Value Date    WBC 4.11 (L) 09/03/2022    HGB 16.2 09/03/2022    HCT 48.3 09/03/2022    MCV 88 09/03/2022     09/03/2022     Lab Results   Component Value Date    K 4.0 09/03/2022     09/03/2022    CO2 25 09/03/2022    BUN 17 09/03/2022    CREATININE 1.18 09/03/2022    GLUF 98 09/03/2022    CALCIUM 8.9 09/03/2022    AST 13 09/03/2022    ALT 29 09/03/2022    ALKPHOS 74 09/03/2022    EGFR 73 09/03/2022     Lab Results   Component Value Date    CHOLESTEROL 184 09/03/2022    CHOLESTEROL 200 09/25/2021    CHOLESTEROL 187 09/07/2019     Lab Results   Component Value Date    HDL 36 (L) 09/03/2022    HDL 37 (L) 09/25/2021    HDL 35 (L) 09/07/2019     Lab Results   Component Value Date    LDLCALC 120 (H) 09/03/2022    LDLCALC 133 (H) 09/25/2021    LDLCALC 123 (H) 09/07/2019     Lab Results   Component Value Date    TRIG 142 09/03/2022    TRIG 150 09/25/2021    TRIG 145 09/07/2019     No results found for: "CHOLHDL"  Lab Results   Component Value Date    [id number] 6.400 (H) 09/03/2022     No results found for: "HGBA1C"  Lab Results   Component Value Date    PSA 0.6 09/03/2022       Andres Napier DO

## 2023-09-02 ENCOUNTER — APPOINTMENT (OUTPATIENT)
Dept: LAB | Facility: CLINIC | Age: 48
End: 2023-09-02
Payer: COMMERCIAL

## 2023-09-02 DIAGNOSIS — Z00.00 ROUTINE GENERAL MEDICAL EXAMINATION AT A HEALTH CARE FACILITY: Primary | ICD-10-CM

## 2023-09-02 LAB
ALBUMIN SERPL BCP-MCNC: 4.1 G/DL (ref 3.5–5)
ALP SERPL-CCNC: 57 U/L (ref 34–104)
ALT SERPL W P-5'-P-CCNC: 30 U/L (ref 7–52)
ANION GAP SERPL CALCULATED.3IONS-SCNC: 8 MMOL/L
AST SERPL W P-5'-P-CCNC: 15 U/L (ref 13–39)
BASOPHILS # BLD AUTO: 0.02 THOUSANDS/ÂΜL (ref 0–0.1)
BASOPHILS NFR BLD AUTO: 0 % (ref 0–1)
BILIRUB SERPL-MCNC: 0.66 MG/DL (ref 0.2–1)
BUN SERPL-MCNC: 17 MG/DL (ref 5–25)
CALCIUM SERPL-MCNC: 9.3 MG/DL (ref 8.4–10.2)
CHLORIDE SERPL-SCNC: 101 MMOL/L (ref 96–108)
CHOLEST SERPL-MCNC: 214 MG/DL
CO2 SERPL-SCNC: 30 MMOL/L (ref 21–32)
CREAT SERPL-MCNC: 0.96 MG/DL (ref 0.6–1.3)
EOSINOPHIL # BLD AUTO: 0.01 THOUSAND/ÂΜL (ref 0–0.61)
EOSINOPHIL NFR BLD AUTO: 0 % (ref 0–6)
ERYTHROCYTE [DISTWIDTH] IN BLOOD BY AUTOMATED COUNT: 13.8 % (ref 11.6–15.1)
GFR SERPL CREATININE-BSD FRML MDRD: 93 ML/MIN/1.73SQ M
GLUCOSE P FAST SERPL-MCNC: 101 MG/DL (ref 65–99)
HCT VFR BLD AUTO: 49.4 % (ref 36.5–49.3)
HDLC SERPL-MCNC: 42 MG/DL
HGB BLD-MCNC: 15.9 G/DL (ref 12–17)
IMM GRANULOCYTES # BLD AUTO: 0.04 THOUSAND/UL (ref 0–0.2)
IMM GRANULOCYTES NFR BLD AUTO: 0 % (ref 0–2)
LDLC SERPL CALC-MCNC: 139 MG/DL (ref 0–100)
LYMPHOCYTES # BLD AUTO: 2.11 THOUSANDS/ÂΜL (ref 0.6–4.47)
LYMPHOCYTES NFR BLD AUTO: 23 % (ref 14–44)
MCH RBC QN AUTO: 29.1 PG (ref 26.8–34.3)
MCHC RBC AUTO-ENTMCNC: 32.2 G/DL (ref 31.4–37.4)
MCV RBC AUTO: 90 FL (ref 82–98)
MONOCYTES # BLD AUTO: 0.55 THOUSAND/ÂΜL (ref 0.17–1.22)
MONOCYTES NFR BLD AUTO: 6 % (ref 4–12)
NEUTROPHILS # BLD AUTO: 6.29 THOUSANDS/ÂΜL (ref 1.85–7.62)
NEUTS SEG NFR BLD AUTO: 71 % (ref 43–75)
NONHDLC SERPL-MCNC: 172 MG/DL
NRBC BLD AUTO-RTO: 0 /100 WBCS
PLATELET # BLD AUTO: 281 THOUSANDS/UL (ref 149–390)
PMV BLD AUTO: 9.8 FL (ref 8.9–12.7)
POTASSIUM SERPL-SCNC: 3.9 MMOL/L (ref 3.5–5.3)
PROT SERPL-MCNC: 6.7 G/DL (ref 6.4–8.4)
RBC # BLD AUTO: 5.47 MILLION/UL (ref 3.88–5.62)
SODIUM SERPL-SCNC: 139 MMOL/L (ref 135–147)
TRIGL SERPL-MCNC: 165 MG/DL
TSH SERPL DL<=0.05 MIU/L-ACNC: 3.97 UIU/ML (ref 0.45–4.5)
WBC # BLD AUTO: 9.02 THOUSAND/UL (ref 4.31–10.16)

## 2023-09-02 PROCEDURE — 85025 COMPLETE CBC W/AUTO DIFF WBC: CPT

## 2023-09-02 PROCEDURE — 80061 LIPID PANEL: CPT

## 2023-09-02 PROCEDURE — 84443 ASSAY THYROID STIM HORMONE: CPT

## 2023-09-02 PROCEDURE — 84154 ASSAY OF PSA FREE: CPT

## 2023-09-02 PROCEDURE — 84153 ASSAY OF PSA TOTAL: CPT

## 2023-09-02 PROCEDURE — 80053 COMPREHEN METABOLIC PANEL: CPT

## 2023-09-02 PROCEDURE — 36415 COLL VENOUS BLD VENIPUNCTURE: CPT

## 2023-09-05 LAB
PSA FREE MFR SERPL: 30 %
PSA FREE SERPL-MCNC: 0.15 NG/ML
PSA SERPL-MCNC: 0.5 NG/ML (ref 0–4)

## 2023-09-11 ENCOUNTER — OFFICE VISIT (OUTPATIENT)
Dept: FAMILY MEDICINE CLINIC | Facility: CLINIC | Age: 48
End: 2023-09-11
Payer: COMMERCIAL

## 2023-09-11 VITALS
WEIGHT: 210.2 LBS | HEART RATE: 100 BPM | TEMPERATURE: 98.6 F | RESPIRATION RATE: 18 BRPM | HEIGHT: 71 IN | SYSTOLIC BLOOD PRESSURE: 126 MMHG | OXYGEN SATURATION: 96 % | DIASTOLIC BLOOD PRESSURE: 84 MMHG | BODY MASS INDEX: 29.43 KG/M2

## 2023-09-11 DIAGNOSIS — M54.12 LEFT CERVICAL RADICULOPATHY: ICD-10-CM

## 2023-09-11 DIAGNOSIS — E03.8 OTHER SPECIFIED HYPOTHYROIDISM: ICD-10-CM

## 2023-09-11 DIAGNOSIS — Z00.00 ANNUAL PHYSICAL EXAM: Primary | ICD-10-CM

## 2023-09-11 DIAGNOSIS — E78.2 MIXED HYPERLIPIDEMIA: ICD-10-CM

## 2023-09-11 PROCEDURE — 99396 PREV VISIT EST AGE 40-64: CPT | Performed by: FAMILY MEDICINE

## 2023-09-11 NOTE — ASSESSMENT & PLAN NOTE
Lipid profile shows overall elevation in total cholesterol and triglycerides patient notified to monitor diet now and watch to avoid saturated fats

## 2023-09-11 NOTE — ASSESSMENT & PLAN NOTE
Hypothyroidism stable on levothyroxine at 75 mcg continue same dosage and recheck laboratory work annually as scheduled

## 2023-09-11 NOTE — PROGRESS NOTES
ADULT ANNUAL [facility]    NAME: Silverio Goldstein  AGE: 50 y.o. SEX: male  : 1975     DATE: 2023     Assessment and Plan:     Problem List Items Addressed This Visit        Endocrine    Other specified hypothyroidism     Hypothyroidism stable on levothyroxine at 75 mcg continue same dosage and recheck laboratory work annually as scheduled            Nervous and Auditory    Left cervical radiculopathy     Still has persistent left arm numbness and tingling with certain body positions including driving his vehicle. Prednisone has helped to an extent with the pain overall but he has persistent symptomatology despite therapy. This point we will schedule for MRI C spine         Relevant Orders    MRI cervical spine wo contrast       Other    Annual physical exam - Primary    Mixed hyperlipidemia     Lipid profile shows overall elevation in total cholesterol and triglycerides patient notified to monitor diet now and watch to avoid saturated fats            Immunizations and preventive care screenings were discussed with patient today. Appropriate education was printed on patient's after visit summary. Discussed risks and benefits of prostate cancer screening. We discussed the controversial history of PSA screening for prostate cancer in the [facility] as well as the risk of over detection and over treatment of prostate cancer by way of PSA screening. The patient understands that PSA blood testing is an imperfect way to screen for prostate cancer and that elevated PSA levels in the blood may also be caused by infection, inflammation, prostatic trauma or manipulation, urological procedures, or by benign prostatic enlargement.     The role of the digital rectal examination in prostate cancer screening was also discussed and I discussed with him that there is large interobserver variability in the findings of digital rectal examination. Counseling:  Alcohol/drug use: discussed moderation in alcohol intake, the recommendations for healthy alcohol use, and avoidance of illicit drug use. Dental Health: discussed importance of regular tooth brushing, flossing, and dental visits. Injury prevention: discussed safety/seat belts, safety helmets, smoke detectors, carbon dioxide detectors, and smoking near bedding or upholstery. Sexual health: discussed sexually transmitted diseases, partner selection, use of condoms, avoidance of unintended pregnancy, and contraceptive alternatives. · Exercise: the importance of regular exercise/physical activity was discussed. Recommend exercise 3-5 times per week for at least 30 minutes. Return in about 1 year (around 9/11/2024). Chief Complaint:     Chief Complaint   Patient presents with   • Physical Exam     Labs done 09/02/23      History of Present Illness:     Adult Annual Physical   Patient here for a comprehensive physical exam. The patient reports no problems. Diet and Physical Activity  · Diet/Nutrition: well balanced diet. · Exercise: no formal exercise. Depression Screening  PHQ-2/9 Depression Screening         General Health  · Sleep: sleeps well. · Hearing: normal - bilateral.  · Vision: no vision problems. · Dental: regular dental visits.  Health  · Symptoms include: none     Review of Systems:     Review of Systems   Constitutional: Negative for chills, fatigue and fever. HENT: Negative for congestion, nosebleeds, rhinorrhea, sinus pressure and sore throat. Eyes: Negative for discharge and redness. Respiratory: Negative for cough and shortness of breath. Cardiovascular: Negative for chest pain, palpitations and leg swelling. Gastrointestinal: Negative for abdominal pain, blood in stool and nausea. Endocrine: Negative for cold intolerance, heat intolerance and polyuria. Genitourinary: Negative for dysuria and frequency. Musculoskeletal: Negative for arthralgias, back pain and myalgias. Skin: Negative for rash. Neurological: Negative for dizziness, weakness and headaches. Hematological: Negative for adenopathy. Psychiatric/Behavioral: Negative for behavioral problems and sleep disturbance. The patient is not nervous/anxious. Past Medical History:     History reviewed. No pertinent past medical history. Past Surgical History:     Past Surgical History:   Procedure Laterality Date   • SHOULDER SURGERY        Family History:     History reviewed. No pertinent family history.    Social History:     Social History     Socioeconomic History   • Marital status: /Civil Union     Spouse name: None   • Number of children: None   • Years of education: None   • Highest education level: None   Occupational History   • None   Tobacco Use   • Smoking status: Never   • Smokeless tobacco: Never   Vaping Use   • Vaping Use: Never used   Substance and Sexual Activity   • Alcohol use: No   • Drug use: No   • Sexual activity: None   Other Topics Concern   • None   Social History Narrative   • None     Social Determinants of Health     Financial Resource Strain: Not on file   Food Insecurity: Not on file   Transportation Needs: Not on file   Physical Activity: Not on file   Stress: Not on file   Social Connections: Not on file   Intimate Partner Violence: Not on file   Housing Stability: Not on file      Current Medications:     Current Outpatient Medications   Medication Sig Dispense Refill   • levothyroxine 75 mcg tablet TAKE 1 TABLET (75 MCG TOTAL) BY MOUTH IN THE MORNING 90 tablet 0   • naproxen (Naprosyn) 500 mg tablet Take 1 tablet (500 mg total) by mouth 2 (two) times a day with meals 60 tablet 1   • predniSONE 10 mg tablet 3 tablets daily for 5 days then 2 tablets daily for 5 days then 1 tablet daily for 5 days 30 tablet 1   • meloxicam (Mobic) 15 mg tablet Take 1 tablet (15 mg total) by mouth daily (Patient not taking: Reported on 9/9/2022) 30 tablet 5     No current facility-administered medications for this visit. Allergies: Allergies   Allergen Reactions   • Contrast [Iodinated Contrast Media] Hives   • Sulfa Antibiotics Hives      Physical Exam:     /84 (BP Location: Right arm, Patient Position: Sitting, Cuff Size: Large)   Pulse 100   Temp 98.6 °F (37 °C) (Tympanic)   Resp 18   Ht 5' 10.5" (1.791 m)   Wt 95.3 kg (210 lb 3.2 oz)   SpO2 96%   BMI 29.73 kg/m²     Physical Exam  Vitals and nursing note reviewed. Constitutional:       General: He is not in acute distress. Appearance: Normal appearance. He is well-developed and normal weight. HENT:      Head: Normocephalic and atraumatic. Right Ear: Tympanic membrane, ear canal and external ear normal.      Left Ear: Tympanic membrane, ear canal and external ear normal.      Nose: Nose normal.      Mouth/Throat:      Mouth: Mucous membranes are moist.      Pharynx: Oropharynx is clear. Eyes:      Extraocular Movements: Extraocular movements intact. Conjunctiva/sclera: Conjunctivae normal.      Pupils: Pupils are equal, round, and reactive to light. Cardiovascular:      Rate and Rhythm: Normal rate and regular rhythm. Pulses: Normal pulses. Heart sounds: Normal heart sounds. No murmur heard. Pulmonary:      Effort: Pulmonary effort is normal. No respiratory distress. Breath sounds: Normal breath sounds. Abdominal:      General: Bowel sounds are normal.      Palpations: Abdomen is soft. Tenderness: There is no abdominal tenderness. Musculoskeletal:         General: No swelling. Normal range of motion. Cervical back: Normal range of motion and neck supple. Skin:     General: Skin is warm and dry. Capillary Refill: Capillary refill takes less than 2 seconds. Neurological:      General: No focal deficit present. Mental Status: He is alert and oriented to person, place, and time.  Mental status is at baseline. Psychiatric:         Mood and Affect: Mood normal.         Behavior: Behavior normal.         Thought Content: Thought content normal.         Judgment: Judgment normal.          Carla Mica DO [facility] [address] Counseling: Body mass index is 29.73 kg/m². The BMI is above normal. Nutrition recommendations include reducing portion sizes, decreasing overall calorie intake, 3-5 servings of fruits/vegetables daily, reducing fast food intake, consuming healthier snacks, decreasing soda and/or juice intake, moderation in carbohydrate intake and reducing intake of saturated fat and trans fat. Exercise recommendations include exercising 3-5 times per week.

## 2023-09-11 NOTE — ASSESSMENT & PLAN NOTE
Still has persistent left arm numbness and tingling with certain body positions including driving his vehicle. Prednisone has helped to an extent with the pain overall but he has persistent symptomatology despite therapy.   This point we will schedule for MRI C spine

## 2023-09-19 ENCOUNTER — HOSPITAL ENCOUNTER (OUTPATIENT)
Dept: MRI IMAGING | Facility: CLINIC | Age: 48
Discharge: HOME | End: 2023-09-19
Payer: COMMERCIAL

## 2023-09-19 DIAGNOSIS — M54.12 LEFT CERVICAL RADICULOPATHY: ICD-10-CM

## 2023-09-19 PROCEDURE — 72141 MRI NECK SPINE W/O DYE: CPT

## 2023-09-19 PROCEDURE — G1004 CDSM NDSC: HCPCS

## 2023-09-21 DIAGNOSIS — M54.12 LEFT CERVICAL RADICULOPATHY: Primary | ICD-10-CM

## 2023-09-26 ENCOUNTER — OFFICE VISIT (OUTPATIENT)
Dept: FAMILY MEDICINE CLINIC | Facility: CLINIC | Age: 48
End: 2023-09-26
Payer: COMMERCIAL

## 2023-09-26 VITALS
OXYGEN SATURATION: 99 % | TEMPERATURE: 96.7 F | HEART RATE: 93 BPM | SYSTOLIC BLOOD PRESSURE: 128 MMHG | RESPIRATION RATE: 18 BRPM | HEIGHT: 71 IN | BODY MASS INDEX: 29.9 KG/M2 | WEIGHT: 213.6 LBS | DIASTOLIC BLOOD PRESSURE: 80 MMHG

## 2023-09-26 DIAGNOSIS — R20.2 NUMBNESS AND TINGLING IN LEFT HAND: ICD-10-CM

## 2023-09-26 DIAGNOSIS — M99.01 CERVICAL SOMATIC DYSFUNCTION: ICD-10-CM

## 2023-09-26 DIAGNOSIS — R93.7 ABNORMAL MRI, CERVICAL SPINE: ICD-10-CM

## 2023-09-26 DIAGNOSIS — M54.12 LEFT CERVICAL RADICULOPATHY: Primary | ICD-10-CM

## 2023-09-26 DIAGNOSIS — R20.0 NUMBNESS AND TINGLING IN LEFT HAND: ICD-10-CM

## 2023-09-26 PROCEDURE — 99214 OFFICE O/P EST MOD 30 MIN: CPT | Performed by: FAMILY MEDICINE

## 2023-09-26 PROCEDURE — 98925 OSTEOPATH MANJ 1-2 REGIONS: CPT | Performed by: FAMILY MEDICINE

## 2023-09-26 NOTE — ASSESSMENT & PLAN NOTE
Patient loses feeling in his left upper extremity worsened by sitting standing carrying objects. He has been through physical therapy anti-inflammatory medications and recently chiropractic.   We reviewed the MRI he is agreeable to pain management evaluation now

## 2023-09-26 NOTE — PROGRESS NOTES
Assessment/Plan:       Problem List Items Addressed This Visit        Nervous and Auditory    Left cervical radiculopathy - Primary     Reviewed MRI now. Discussed etiology and treatment options for him now. He notes that pain is worse with sitting and driving. He develops pain into his thumb and first 2 fingers of the left hand. Chiropractic treatment helps temporarily and symptoms return later that evening in bed and upon awakening symptoms are back. Pain management evaluation. Went to physical therapy and took meloxicam helped temporarily and now symptoms have returned and are persistent  C4-5 C5-6 C6-7 disc protrusions         Relevant Orders    Ambulatory referral to Spine & Pain Management       Other    Numbness and tingling in left hand     Patient loses feeling in his left upper extremity worsened by sitting standing carrying objects. He has been through physical therapy anti-inflammatory medications and recently chiropractic. We reviewed the MRI he is agreeable to pain management evaluation now         Relevant Orders    Ambulatory referral to Spine & Pain Management    Cervical somatic dysfunction     Continue myofascial contraction technique withgood results temporary. Abnormal MRI, cervical spine     Discussed and reviewed MRI report showing cervical disc protrusions and osteophytes at C4-5 C5-6 and C6-7. Patient would benefit from pain management evaluation now. Relevant Orders    Ambulatory referral to Spine & Pain Management         Subjective:      Patient ID: Luis Angel Smith is a 50 y.o. male.     Neck pain and left upper extremity radicular pain MRI and options for treatment plan now moving forward      The following portions of the patient's history were reviewed and updated as appropriate: allergies, current medications, past family history, past medical history, past social history, past surgical history and problem list.    Review of Systems   Constitutional: Negative for chills, fatigue and fever. HENT: Negative for congestion, nosebleeds, rhinorrhea, sinus pressure and sore throat. Eyes: Negative for discharge and redness. Respiratory: Negative for cough and shortness of breath. Cardiovascular: Negative for chest pain, palpitations and leg swelling. Gastrointestinal: Negative for abdominal pain, blood in stool and nausea. Endocrine: Negative for cold intolerance, heat intolerance and polyuria. Genitourinary: Negative for dysuria and frequency. Musculoskeletal: Negative for arthralgias, back pain and myalgias. Skin: Negative for rash. Neurological: Negative for dizziness, weakness and headaches. Hematological: Negative for adenopathy. Psychiatric/Behavioral: Negative for behavioral problems and sleep disturbance. The patient is not nervous/anxious. Objective:      /80 (BP Location: Left arm, Patient Position: Sitting, Cuff Size: Standard)   Pulse 93   Temp (!) 96.7 °F (35.9 °C) (Tympanic)   Resp 18   Ht 5' 10.5" (1.791 m)   Wt 96.9 kg (213 lb 9.6 oz)   SpO2 99%   BMI 30.22 kg/m²          Physical Exam  Vitals and nursing note reviewed. Constitutional:       Appearance: Normal appearance. He is well-developed. HENT:      Head: Normocephalic and atraumatic. Right Ear: External ear normal.      Left Ear: External ear normal.      Nose: Nose normal.   Eyes:      General: No scleral icterus. Conjunctiva/sclera: Conjunctivae normal.      Pupils: Pupils are equal, round, and reactive to light. Neck:      Thyroid: No thyromegaly. Vascular: No JVD. Cardiovascular:      Rate and Rhythm: Normal rate and regular rhythm. Heart sounds: Normal heart sounds. No murmur heard. Pulmonary:      Effort: Pulmonary effort is normal.      Breath sounds: Normal breath sounds. No wheezing or rales. Chest:      Chest wall: No tenderness. Abdominal:      General: Bowel sounds are normal. There is no distension.       Palpations: Abdomen is soft. There is no mass. Tenderness: There is no abdominal tenderness. There is no guarding or rebound. Musculoskeletal:         General: No tenderness or deformity. Normal range of motion. Cervical back: Normal range of motion and neck supple. Lymphadenopathy:      Cervical: No cervical adenopathy. Skin:     General: Skin is warm and dry. Findings: No erythema or rash. Neurological:      Mental Status: He is alert and oriented to person, place, and time. Cranial Nerves: No cranial nerve deficit. Deep Tendon Reflexes: Reflexes are normal and symmetric. Reflexes normal.   Psychiatric:         Behavior: Behavior normal.         Thought Content: Thought content normal.         Judgment: Judgment normal.        OMT    Performed by: Nicolette Contreras DO  Authorized by: Nicolette Contreras DO  Universal Protocol:  Consent given by: patient  Patient understanding: patient states understanding of the procedure being performed  Patient identity confirmed: verbally with patient        Procedure Details:     Region evaluated and treated:  Cervical    Cervical Details:     Examination Method:  Tenderness, Pain, Active and Range of Motion, Contracture    Severity:  Moderate    Treatment Method:  Counterstrain Treatment, Myofascial Release Treatment and Soft Tissue Treatment    Response:  Improved - The somatic dysfunction is improved but not completely resolved. Total Regions Treated:  1    I have spent a total time of 45 minutes on 09/26/23 in caring for this patient including Diagnostic results, Prognosis, Risks and benefits of tx options, Instructions for management, Importance of tx compliance, Risk factor reductions, Impressions, Counseling / Coordination of care, Documenting in the medical record, Reviewing / ordering tests, medicine, procedures  , Obtaining or reviewing history   and Communicating with other healthcare professionals .       Data:    Laboratory Results: I have personally reviewed the pertinent laboratory results/reports   Radiology/Other Diagnostic Testing Results: I have personally reviewed pertinent reports.        Lab Results   Component Value Date    WBC 9.02 09/02/2023    HGB 15.9 09/02/2023    HCT 49.4 (H) 09/02/2023    MCV 90 09/02/2023     09/02/2023     Lab Results   Component Value Date    K 3.9 09/02/2023     09/02/2023    CO2 30 09/02/2023    BUN 17 09/02/2023    CREATININE 0.96 09/02/2023    GLUF 101 (H) 09/02/2023    CALCIUM 9.3 09/02/2023    AST 15 09/02/2023    ALT 30 09/02/2023    ALKPHOS 57 09/02/2023    EGFR 93 09/02/2023     Lab Results   Component Value Date    CHOLESTEROL 214 (H) 09/02/2023    CHOLESTEROL 184 09/03/2022    CHOLESTEROL 200 09/25/2021     Lab Results   Component Value Date    HDL 42 09/02/2023    HDL 36 (L) 09/03/2022    HDL 37 (L) 09/25/2021     Lab Results   Component Value Date    LDLCALC 139 (H) 09/02/2023    LDLCALC 120 (H) 09/03/2022    LDLCALC 133 (H) 09/25/2021     Lab Results   Component Value Date    TRIG 165 (H) 09/02/2023    TRIG 142 09/03/2022    TRIG 150 09/25/2021     No results found for: "CHOLHDL"  Lab Results   Component Value Date    WVE9HMAONVJP 3.969 09/02/2023     No results found for: "HGBA1C"  Lab Results   Component Value Date    PSA 0.5 09/02/2023       Rubens Rebollar DO

## 2023-09-26 NOTE — ASSESSMENT & PLAN NOTE
Discussed and reviewed MRI report showing cervical disc protrusions and osteophytes at C4-5 C5-6 and C6-7. Patient would benefit from pain management evaluation now.

## 2023-09-26 NOTE — ASSESSMENT & PLAN NOTE
Reviewed MRI now. Discussed etiology and treatment options for him now. He notes that pain is worse with sitting and driving. He develops pain into his thumb and first 2 fingers of the left hand. Chiropractic treatment helps temporarily and symptoms return later that evening in bed and upon awakening symptoms are back. Pain management evaluation.   Went to physical therapy and took meloxicam helped temporarily and now symptoms have returned and are persistent  C4-5 C5-6 C6-7 disc protrusions

## 2023-10-19 ENCOUNTER — CONSULT (OUTPATIENT)
Dept: PAIN MEDICINE | Facility: CLINIC | Age: 48
End: 2023-10-19
Payer: COMMERCIAL

## 2023-10-19 ENCOUNTER — TELEPHONE (OUTPATIENT)
Dept: PAIN MEDICINE | Facility: CLINIC | Age: 48
End: 2023-10-19

## 2023-10-19 VITALS
WEIGHT: 210 LBS | SYSTOLIC BLOOD PRESSURE: 133 MMHG | BODY MASS INDEX: 29.71 KG/M2 | HEART RATE: 87 BPM | DIASTOLIC BLOOD PRESSURE: 86 MMHG

## 2023-10-19 DIAGNOSIS — M54.12 CERVICAL RADICULOPATHY: ICD-10-CM

## 2023-10-19 DIAGNOSIS — M54.2 NECK PAIN: ICD-10-CM

## 2023-10-19 DIAGNOSIS — M54.12 LEFT CERVICAL RADICULOPATHY: Primary | ICD-10-CM

## 2023-10-19 DIAGNOSIS — M26.609 TMJ DYSFUNCTION: ICD-10-CM

## 2023-10-19 PROCEDURE — 99204 OFFICE O/P NEW MOD 45 MIN: CPT | Performed by: STUDENT IN AN ORGANIZED HEALTH CARE EDUCATION/TRAINING PROGRAM

## 2023-10-19 NOTE — PATIENT INSTRUCTIONS
Epidural Steroid Injection   AMBULATORY CARE:   What you need to know about an epidural steroid injection (ROGERS):  An ROGERS is a procedure to inject steroid medicine into the epidural space. The epidural space is between your spinal cord and vertebrae. Steroids reduce inflammation and fluid buildup in your spine that may be causing pain. You may be given pain medicine along with the steroids. How to prepare for an ROGERS:  Your provider will talk to you about how to prepare for your procedure. He or she will tell you what medicines to take or not take on the day of your procedure. You may need to stop taking blood thinners or other medicines several days before your procedure. You may need to adjust any diabetes medicine you take on the day of your procedure. Steroid medicine can increase your blood sugar level. Arrange for someone to drive you home when you are discharged. What will happen during an ROGERS:   You will be given medicine to numb the procedure area. You will be awake for the procedure, but you will not feel pain. You may also be given medicine to help you relax. Contrast liquid will be used to help your provider see the area better. Tell the provider if you have ever had an allergic reaction to contrast liquid. Your provider may place the needle into your neck area, middle of your back, or tailbone area. He or she may inject the medicine next to the nerves that are causing your pain. He or she may instead inject the medicine into a larger area of the epidural space. This helps the medicine spread to more nerves. Your provider will use a fluoroscope to help guide the needle to the right place. A fluoroscope is a type of x-ray. After the procedure, a bandage will be placed over the injection site to prevent infection. What will happen after an ROGERS:  You will have a bandage over the injection site to prevent infection. Your provider will tell you when you can bathe and any activity guidelines.  You will be able to go home. Risks of an ROGERS:  You may have temporary or permanent nerve damage or paralysis. You may have bleeding or develop a serious infection, such as meningitis (swelling of the brain coverings). An abscess may also develop. An abscess is a pus-filled area under the skin. You may need surgery to fix the abscess. You may have a seizure, anxiety, or trouble sleeping. If you are a man, you may have temporary erectile dysfunction (not able to have an erection). Call your local emergency number (911 in the [address]) if:   You have a seizure. You have trouble moving your legs. Seek care immediately if:   Blood soaks through your bandage. You have a fever or chills, severe back pain, and the procedure area is sensitive to the touch. You cannot control when you urinate or have a bowel movement. Call your doctor if:   You have weakness or numbness in your legs. Your wound is red, swollen, or draining pus. You have nausea or are vomiting. Your face or neck is red and you feel warm. You have more pain than you had before the procedure. You have swelling in your hands or feet. You have questions or concerns about your condition or care. Care for your wound as directed: You may remove the bandage before you go to bed the day of your procedure. You may take a shower, but do not take a bath for at least 24 hours. Self-care:   Do not drive,  use machines, or do strenuous activity for 24 hours after your procedure or as directed. Continue other treatments  as directed. Steroid injections alone will not control your pain. The injections are meant to be used with other treatments, such as physical therapy. Follow up with your doctor as directed:  Write down your questions so you remember to ask them during your visits. © Copyright [employer] Fuentes 2023 Information is for End User's use only and may not be sold, redistributed or otherwise used for commercial purposes.   The above information is an  only. It is not intended as medical advice for individual conditions or treatments. Talk to your doctor, nurse or pharmacist before following any medical regimen to see if it is safe and effective for you.

## 2023-10-19 NOTE — PROGRESS NOTES
Assessment:  1. Left cervical radiculopathy    2. Neck pain    3. TMJ dysfunction      Patient is a pleasant 63-year-old man presenting with 1 year of neck pain with left-sided radicular features. Patient works as a .  There is undetermined cause of the start of his pain but over the past month the intensity pain has been moderate to severe. He rates his pain as a 7 out of 10 on numeric rating scale and the pain does interfere with daily activities. The pain is occurring constantly, 100% of the time. There is no typical pattern regards to the time of day where symptoms are better or worse. He describes the pain as numbing, pins-and-needles, dull and aching. He does not have any associated weakness in upper or lower extremities and does not use any assistive devices. Activities that cause no change in symptoms include prayer, standing, bending, walking, exercise, relaxation, coughing, sneezing, bowel movement. He notes an increase in his pain with lying down, sitting. Patient to get MRI of cervical spine on 9/19/2023 which did show disc protrusion at C5-C6 and C6-C7. Prior pain treatments include traction which provided him excellent relief, exercise which provided moderate relief, osteopathic manipulation which provided moderate relief, heat and ice which provide mild relief and chiropractic manipulation which provided moderate relief. Patient was prescribed Mobic, naproxen and prednisone with no relief with any of these medications. In the past he has used Tylenol which was helpful. He has not been trialed on any neuropathic agents. On exam patient with pain with extension and rotation to the left of the cervical spine. Relief of symptoms with flexion of the cervical spine. Negative [place]. Strength 5 out of 5 in bilateral upper extremities with abnormal sensation to light touch in the left upper extremity.   Patient symptoms likely secondary to cervical radiculopathy secondary to his disc herniation in the cervical spine. Given this we will move forward with C7-T1 cervical epidural steroid injection under fluoroscopic guidance. Patient counseled on risk and benefit of this procedure and would like to proceed. Plan:  Schedule for C7-T1 VAL  Epidural Injection Medical Necessity  The patient has evidence of  cervical canal stenosis  severe enough to greatly impact quality of life or function. The patient is concurrently involved in a conservative treatment plan including:    [x] Medications   [x] Physical therapy   [] Psychological therapy   [x] Home exercise or stretching program   [x] Multidisciplinary healthcare provider team    Plan to perform with contrast with documented contrast allergy. *Will use gadolinium contrast. No more than 15mg dexamethasone planned per session. The patient has not undergone more than 4 injections in the last 12 months. If Repeat Epidural  [] Prior epidural provided >50% pain relief and/or function for at least three months. [] Patient failed to respond to prior epidural and plan for alternative approach as above. 2. Continue chiropractic care  3. Continue physical therapy   4. Follow up as needed. If no relief with VAL can consider gabapentin    No orders of the defined types were placed in this encounter. No orders of the defined types were placed in this encounter. My impressions and treatment recommendations were discussed in detail with the patient, who verbalized understanding and had no further questions. Complete risks and benefits including bleeding, infection, tissue reaction, nerve injury and allergic reaction were discussed. The approach was demonstrated using models and literature was provided. Verbal and written consent was obtained. Follow-up is planned in four weeks time or sooner as warranted. Discharge instructions were provided.  I personally saw and examined the patient and I agree with the above discussed plan of care. History of Present Illness:    Charles Flanagan is a 50 y.o. male who presents to [facility] for initial evaluation of the above stated pain complaints. The patient has a past medical and chronic pain history as outlined in the assessment section. He was referred by Hiwot Ordoñez DO  [address] [place] [address],  [address] [place] [address] . Patient is a pleasant 55-year-old man presenting with 1 year of neck pain with left-sided radicular features. Patient works as a .  There is undetermined cause of the start of his pain but over the past month the intensity pain has been moderate to severe. He rates his pain as a 7 out of 10 on numeric rating scale and the pain does interfere with daily activities. The pain is occurring constantly, 100% of the time. There is no typical pattern regards to the time of day where symptoms are better or worse. He describes the pain as numbing, pins-and-needles, dull and aching. He does not have any associated weakness in upper or lower extremities and does not use any assistive devices. Activities that cause no change in symptoms include prayer, standing, bending, walking, exercise, relaxation, coughing, sneezing, bowel movement. He notes an increase in his pain with lying down, sitting. Patient to get MRI of cervical spine on 9/19/2023 which did show disc protrusion at C5-C6 and C6-C7. Prior pain treatments include traction which provided him excellent relief, exercise which provided moderate relief, osteopathic manipulation which provided moderate relief, heat and ice which provide mild relief and chiropractic manipulation which provided moderate relief. Patient was prescribed Mobic, naproxen and prednisone with no relief with any of these medications. In the past he has used Tylenol which was helpful. He has not been trialed on any neuropathic agents.     Review of Systems:    Review of Systems   Constitutional:  Negative for chills and fatigue. HENT:  Negative for ear pain, mouth sores and sinus pressure. Eyes:  Negative for pain, redness and visual disturbance. Respiratory:  Negative for shortness of breath and wheezing. Cardiovascular:  Negative for chest pain and palpitations. Gastrointestinal:  Negative for abdominal pain and nausea. Endocrine: Negative for polyphagia. Musculoskeletal:  Positive for neck pain and neck stiffness. Negative for arthralgias and back pain. Decreased ROM,  joint and muscle pain in the arm and neck(left)   Skin:  Negative for wound. Neurological:  Positive for numbness. Negative for seizures and weakness. Psychiatric/Behavioral:  Positive for sleep disturbance. Negative for dysphoric mood. History reviewed. No pertinent past medical history. Past Surgical History:   Procedure Laterality Date   • SHOULDER SURGERY         History reviewed. No pertinent family history.     Social History     Occupational History   • Not on file   Tobacco Use   • Smoking status: Never   • Smokeless tobacco: Never   Vaping Use   • Vaping Use: Never used   Substance and Sexual Activity   • Alcohol use: No   • Drug use: No   • Sexual activity: Not on file         Current Outpatient Medications:   •  levothyroxine 75 mcg tablet, TAKE 1 TABLET (75 MCG TOTAL) BY MOUTH IN THE MORNING, Disp: 90 tablet, Rfl: 0  •  meloxicam (Mobic) 15 mg tablet, Take 1 tablet (15 mg total) by mouth daily (Patient not taking: Reported on 9/9/2022), Disp: 30 tablet, Rfl: 5  •  naproxen (Naprosyn) 500 mg tablet, Take 1 tablet (500 mg total) by mouth 2 (two) times a day with meals (Patient not taking: Reported on 9/26/2023), Disp: 60 tablet, Rfl: 1  •  predniSONE 10 mg tablet, 3 tablets daily for 5 days then 2 tablets daily for 5 days then 1 tablet daily for 5 days (Patient not taking: Reported on 9/26/2023), Disp: 30 tablet, Rfl: 1    Allergies   Allergen Reactions   • Contrast [Iodinated Contrast Media] Hives   • Sulfa Antibiotics Hives       Physical Exam:    /86   Pulse 87   Wt 95.3 kg (210 lb)   BMI 29.71 kg/m²     Constitutional: normal, well developed, well nourished, alert, in no distress and non-toxic and no overt pain behavior. Eyes: anicteric  HEENT: grossly intact  Neck: supple, symmetric, trachea midline and no masses   Pulmonary:even and unlabored  Cardiovascular:No edema or pitting edema present  Skin:Normal without rashes or lesions and well hydrated  Psychiatric:Mood and affect appropriate  Neurologic:Cranial Nerves II-XII grossly intact  Musculoskeletal:normal gait. Strength 5/5 in bilateral upper extremities. Reduced sensation to light touch in LUE. Pain with extension and rotation of the cervical spine. Imaging   MRI cervical spine wo contrast  Status: Final result     PACS Images     Show images for MRI cervical spine wo contrast    MRI cervical spine wo contrast: Result Notes     Marquita Huffman  9/25/2023  8:18 AM EDT       Patient notified    Maruqita uHffman  9/22/2023  1:15 PM EDT       Left message to call back    Zoë Kelley  9/22/2023  7:34 AM EDT       Patient asked if he should continue with Chiropractor next week as he has two appointments scheduled? Zoë Kelley  9/21/2023  5:11 PM EDT       Called patient, no answer, left detailed message. Provided information to Dr. Adriane Silver office and Physical Therapy locations. Any questions or concerns to call the office. Randy Alamo DO  9/21/2023  4:59 PM EDT       Notify patient MRI of neck shows disc and osteophytes at C5-6 and C6-7 on the left side with protruding disks causing symptoms into his left arm and I recommend a pain management evaluation. He can also do physical therapy            Study Result    Narrative & Impression   MRI CERVICAL SPINE WITHOUT CONTRAST     INDICATION: M54.12: Radiculopathy, cervical region. Chronic left-sided neck pain radiating into the left arm. COMPARISON:  None.      TECHNIQUE: Multiplanar, multisequence imaging of the cervical spine was performed. .        IMAGE QUALITY:  Diagnostic     FINDINGS:     ALIGNMENT: Mild reversal the cervical lordosis centered at the C6 level. No subluxation. MARROW SIGNAL: Mild scattered degenerative endplate changes. No focally suspicious marrow lesions. No bone marrow edema or compression abnormality. CERVICAL AND VISUALIZED THORACIC CORD:  Normal signal within the visualized cord. PREVERTEBRAL AND PARASPINAL SOFT TISSUES: Trace mucosal thickening in the maxillary sinuses. VISUALIZED POSTERIOR FOSSA:  The visualized posterior fossa demonstrates no abnormal signal.     CERVICAL DISC SPACES:     C2-C3:  Normal.     C3-C4:  Normal.     C4-C5: There is a small left neural foraminal disc protrusion. Mild central canal and left neural foraminal narrowing. Right neural foramen patent. C5-C6: There is a disc osteophyte complex with a superimposed left neural foraminal disc protrusion. Moderate central canal and left neural foraminal narrowing. Mild to moderate right neural foraminal narrowing. C6-C7: There is a disc osteophyte complex with a superimposed left neural foraminal disc protrusion. Moderate left neural foraminal narrowing. Mild central canal narrowing. Mild right neural foraminal narrowing. C7-T1:  Normal.     UPPER THORACIC DISC SPACES:  Normal.     OTHER FINDINGS:  None. IMPRESSION:     Multilevel spondylosis most pronounced at C5-C6 and C6-C7. No cord compression or cord signal abnormality. Workstation performed: [id number]        Imaging    MRI cervical spine wo contrast (Order: [id number]) - 9/19/2023    Result History    MRI cervical spine wo contrast (Order #[id number]) on 9/21/2023 - Order Result History Report    Order Report     Order Details      Order Questions    Question Answer   What is the patient's sedation requirement?  If Medication for Claustrophobia is selected, order medication at this point. No Sedation   Does this procedure require the 3T MRI at [facility] or [place]? No   Release to patient through [address] [place] [address] Immediate   Is order priority selected as STAT? No   Exam reason Cervical radiculopathy   Note: Enter reason for exam              Result Information    Status Priority Source   Final result (9/21/2023  4:43 PM) Routine        MRI cervical spine wo contrast: Result Notes     Carlylefranky Anisha  9/25/2023  8:18 AM EDT         Patient notified    Sarah Lancaster  9/22/2023  1:15 PM EDT         Left message to call back    Daria Elgin  9/22/2023  7:34 AM EDT         Patient asked if he should continue with Chiropractor next week as he has two appointments scheduled? Juan Franciscosommermarcia Elgin  9/21/2023  5:11 PM EDT         Called patient, no answer, left detailed message. Provided information to Dr. Angelina Pérze office and Physical Therapy locations. Any questions or concerns to call the office. Gonzalo Sharma DO  9/21/2023  4:59 PM EDT         Notify patient MRI of neck shows disc and osteophytes at C5-6 and C6-7 on the left side with protruding disks causing symptoms into his left arm and I recommend a pain management evaluation. He can also do physical therapy            Reason for Exam    Cervical radiculopathy; Cervical radiculopathy, no red flags; Cervical radiculopathy   Dx: Left cervical radiculopathy [G73.20 (ICD-10-CM)]     All Reviewers List    Sarah Lancaster on 9/25/2023  8:18 AM   Sarah Lancaster on 9/22/2023  1:35 PM   Gonzalo Sharma DO on 9/21/2023  5:01 PM         MRI cervical spine wo contrast: Patient Communication     Add Comments   Seen       Signed by    Signed Date/Time  Phone Pager   Amor Calloway [facility] 9/21/2023 16:43 [phone]        Exam Information    Status Exam Begun  Exam Ended  Performing Tech   Final [99] 9/19/2023 07:26 9/19/2023 08:01 Miya Mcfarlane       Screening Form Questions     important suggestion  Questionnaires are displayed in the order they were answered. Answer Comment   Has patient changed into the appropriate hospital gown? What are your symptoms? Pain in between shoulders causes pain in left arm and numbness in hand    Do you have a cardiac pacemaker or internal defibrillator? NA    Please list /make/model:     Have you had any HEART surgery? None    Please list make/model:     Heart surgery: If "other", please describe:     Have you had any BRAIN surgery? None    Please list  or describe implant:     Brain surgery: If "other", please describe:     Have you had any EYE surgery? None    Eye surgery: If "other", please describe:     Have you ever injured your eyes with metal or metal fragments (grinding,metallic slivers)? No    Eye injury: Please describe:     Have you had any EAR surgery? None    Ear surgery: If "other", please describe:     Do you have an electronic "stimulation" device? None    Please provide  information:     Have you had any abdominal or pelvic surgery? No    Have you had any of the following abdominal or pelvic surgeries:     Abdominal/pelvic surgery: If "other", please describe:     Do you have any ORTHOPEDIC implants/devices? None    Do you have the following: None    Do you have liver disease? None    Do you have kidney disease? None    Have you had a problem with a previous MRI? No    Does the patient require pre-medication? Do you have a personal history of cancer? None    If "other", please specify:       Are you wearing medication patches? No    Are you wearing any of the following: None    Did the patient provide this information? Yes    Who provided this information (if not the patient)? Relationship to the patient:     Contact number:     MRI STAFF ONLY- Prior imaging reviewed in PACS (initials): sc    MRI STAFF ONLY- Epic chart reviewed (initials): sc    MRI STAFF ONLY: The patient was scheduled to receive MRI contrast and has received the Medication Guide.  No contrast given External Results Report    Open External Results Report      Encounter    View Encounter                     Patient Care Timeline    No data selected in time range    Pre-op Summary    Pre-op             Recovery Summary    Recovery                 Routing History    None             No orders to display       No orders of the defined types were placed in this encounter.

## 2023-10-19 NOTE — H&P (VIEW-ONLY)
Assessment:  1. Left cervical radiculopathy    2. Neck pain    3. TMJ dysfunction      Patient is a pleasant 55-year-old man presenting with 1 year of neck pain with left-sided radicular features. Patient works as a .  There is undetermined cause of the start of his pain but over the past month the intensity pain has been moderate to severe. He rates his pain as a 7 out of 10 on numeric rating scale and the pain does interfere with daily activities. The pain is occurring constantly, 100% of the time. There is no typical pattern regards to the time of day where symptoms are better or worse. He describes the pain as numbing, pins-and-needles, dull and aching. He does not have any associated weakness in upper or lower extremities and does not use any assistive devices. Activities that cause no change in symptoms include prayer, standing, bending, walking, exercise, relaxation, coughing, sneezing, bowel movement. He notes an increase in his pain with lying down, sitting. Patient to get MRI of cervical spine on 9/19/2023 which did show disc protrusion at C5-C6 and C6-C7. Prior pain treatments include traction which provided him excellent relief, exercise which provided moderate relief, osteopathic manipulation which provided moderate relief, heat and ice which provide mild relief and chiropractic manipulation which provided moderate relief. Patient was prescribed Mobic, naproxen and prednisone with no relief with any of these medications. In the past he has used Tylenol which was helpful. He has not been trialed on any neuropathic agents. On exam patient with pain with extension and rotation to the left of the cervical spine. Relief of symptoms with flexion of the cervical spine. Negative [place]. Strength 5 out of 5 in bilateral upper extremities with abnormal sensation to light touch in the left upper extremity.   Patient symptoms likely secondary to cervical radiculopathy secondary to Patient is stable on medications.  I am leaving [place], so can you prescribe sertraline and naltrexone? Thanks for reviewing. his disc herniation in the cervical spine. Given this we will move forward with C7-T1 cervical epidural steroid injection under fluoroscopic guidance. Patient counseled on risk and benefit of this procedure and would like to proceed. Plan:  Schedule for C7-T1 VAL  Epidural Injection Medical Necessity  The patient has evidence of  cervical canal stenosis  severe enough to greatly impact quality of life or function. The patient is concurrently involved in a conservative treatment plan including:    [x] Medications   [x] Physical therapy   [] Psychological therapy   [x] Home exercise or stretching program   [x] Multidisciplinary healthcare provider team    Plan to perform with contrast with documented contrast allergy. *Will use gadolinium contrast. No more than 15mg dexamethasone planned per session. The patient has not undergone more than 4 injections in the last 12 months. If Repeat Epidural  [] Prior epidural provided >50% pain relief and/or function for at least three months. [] Patient failed to respond to prior epidural and plan for alternative approach as above. 2. Continue chiropractic care  3. Continue physical therapy   4. Follow up as needed. If no relief with VAL can consider gabapentin    No orders of the defined types were placed in this encounter. No orders of the defined types were placed in this encounter. My impressions and treatment recommendations were discussed in detail with the patient, who verbalized understanding and had no further questions. Complete risks and benefits including bleeding, infection, tissue reaction, nerve injury and allergic reaction were discussed. The approach was demonstrated using models and literature was provided. Verbal and written consent was obtained. Follow-up is planned in four weeks time or sooner as warranted. Discharge instructions were provided.  I personally saw and examined the patient and I agree with the above discussed plan of care. History of Present Illness:    Cara Escudero is a 50 y.o. male who presents to [facility] for initial evaluation of the above stated pain complaints. The patient has a past medical and chronic pain history as outlined in the assessment section. He was referred by Ashleigh Espinoza DO  [address] [place] [address],  [address] [place] [address] . Patient is a pleasant 60-year-old man presenting with 1 year of neck pain with left-sided radicular features. Patient works as a .  There is undetermined cause of the start of his pain but over the past month the intensity pain has been moderate to severe. He rates his pain as a 7 out of 10 on numeric rating scale and the pain does interfere with daily activities. The pain is occurring constantly, 100% of the time. There is no typical pattern regards to the time of day where symptoms are better or worse. He describes the pain as numbing, pins-and-needles, dull and aching. He does not have any associated weakness in upper or lower extremities and does not use any assistive devices. Activities that cause no change in symptoms include prayer, standing, bending, walking, exercise, relaxation, coughing, sneezing, bowel movement. He notes an increase in his pain with lying down, sitting. Patient to get MRI of cervical spine on 9/19/2023 which did show disc protrusion at C5-C6 and C6-C7. Prior pain treatments include traction which provided him excellent relief, exercise which provided moderate relief, osteopathic manipulation which provided moderate relief, heat and ice which provide mild relief and chiropractic manipulation which provided moderate relief. Patient was prescribed Mobic, naproxen and prednisone with no relief with any of these medications. In the past he has used Tylenol which was helpful. He has not been trialed on any neuropathic agents.     Review of Systems:    Review of Systems   Constitutional:  Negative for chills and fatigue. HENT:  Negative for ear pain, mouth sores and sinus pressure. Eyes:  Negative for pain, redness and visual disturbance. Respiratory:  Negative for shortness of breath and wheezing. Cardiovascular:  Negative for chest pain and palpitations. Gastrointestinal:  Negative for abdominal pain and nausea. Endocrine: Negative for polyphagia. Musculoskeletal:  Positive for neck pain and neck stiffness. Negative for arthralgias and back pain. Decreased ROM,  joint and muscle pain in the arm and neck(left)   Skin:  Negative for wound. Neurological:  Positive for numbness. Negative for seizures and weakness. Psychiatric/Behavioral:  Positive for sleep disturbance. Negative for dysphoric mood. History reviewed. No pertinent past medical history. Past Surgical History:   Procedure Laterality Date   • SHOULDER SURGERY         History reviewed. No pertinent family history.     Social History     Occupational History   • Not on file   Tobacco Use   • Smoking status: Never   • Smokeless tobacco: Never   Vaping Use   • Vaping Use: Never used   Substance and Sexual Activity   • Alcohol use: No   • Drug use: No   • Sexual activity: Not on file         Current Outpatient Medications:   •  levothyroxine 75 mcg tablet, TAKE 1 TABLET (75 MCG TOTAL) BY MOUTH IN THE MORNING, Disp: 90 tablet, Rfl: 0  •  meloxicam (Mobic) 15 mg tablet, Take 1 tablet (15 mg total) by mouth daily (Patient not taking: Reported on 9/9/2022), Disp: 30 tablet, Rfl: 5  •  naproxen (Naprosyn) 500 mg tablet, Take 1 tablet (500 mg total) by mouth 2 (two) times a day with meals (Patient not taking: Reported on 9/26/2023), Disp: 60 tablet, Rfl: 1  •  predniSONE 10 mg tablet, 3 tablets daily for 5 days then 2 tablets daily for 5 days then 1 tablet daily for 5 days (Patient not taking: Reported on 9/26/2023), Disp: 30 tablet, Rfl: 1    Allergies   Allergen Reactions   • Contrast [Iodinated Contrast Media] Hives   • Sulfa Antibiotics Hives       Physical Exam:    /86   Pulse 87   Wt 95.3 kg (210 lb)   BMI 29.71 kg/m²     Constitutional: normal, well developed, well nourished, alert, in no distress and non-toxic and no overt pain behavior. Eyes: anicteric  HEENT: grossly intact  Neck: supple, symmetric, trachea midline and no masses   Pulmonary:even and unlabored  Cardiovascular:No edema or pitting edema present  Skin:Normal without rashes or lesions and well hydrated  Psychiatric:Mood and affect appropriate  Neurologic:Cranial Nerves II-XII grossly intact  Musculoskeletal:normal gait. Strength 5/5 in bilateral upper extremities. Reduced sensation to light touch in LUE. Pain with extension and rotation of the cervical spine. Imaging   MRI cervical spine wo contrast  Status: Final result     PACS Images     Show images for MRI cervical spine wo contrast    MRI cervical spine wo contrast: Result Notes     Kostas Olguin  9/25/2023  8:18 AM EDT       Patient notified    Kostas Olguin  9/22/2023  1:15 PM EDT       Left message to call back    Jose Roberto Zuniga  9/22/2023  7:34 AM EDT       Patient asked if he should continue with Chiropractor next week as he has two appointments scheduled? Jose Roberto Zuniga  9/21/2023  5:11 PM EDT       Called patient, no answer, left detailed message. Provided information to Dr. Ned Councilman office and Physical Therapy locations. Any questions or concerns to call the office. Kylie Denton DO  9/21/2023  4:59 PM EDT       Notify patient MRI of neck shows disc and osteophytes at C5-6 and C6-7 on the left side with protruding disks causing symptoms into his left arm and I recommend a pain management evaluation. He can also do physical therapy            Study Result    Narrative & Impression   MRI CERVICAL SPINE WITHOUT CONTRAST     INDICATION: M54.12: Radiculopathy, cervical region. Chronic left-sided neck pain radiating into the left arm. COMPARISON:  None.      TECHNIQUE: Multiplanar, multisequence imaging of the cervical spine was performed. .        IMAGE QUALITY:  Diagnostic     FINDINGS:     ALIGNMENT: Mild reversal the cervical lordosis centered at the C6 level. No subluxation. MARROW SIGNAL: Mild scattered degenerative endplate changes. No focally suspicious marrow lesions. No bone marrow edema or compression abnormality. CERVICAL AND VISUALIZED THORACIC CORD:  Normal signal within the visualized cord. PREVERTEBRAL AND PARASPINAL SOFT TISSUES: Trace mucosal thickening in the maxillary sinuses. VISUALIZED POSTERIOR FOSSA:  The visualized posterior fossa demonstrates no abnormal signal.     CERVICAL DISC SPACES:     C2-C3:  Normal.     C3-C4:  Normal.     C4-C5: There is a small left neural foraminal disc protrusion. Mild central canal and left neural foraminal narrowing. Right neural foramen patent. C5-C6: There is a disc osteophyte complex with a superimposed left neural foraminal disc protrusion. Moderate central canal and left neural foraminal narrowing. Mild to moderate right neural foraminal narrowing. C6-C7: There is a disc osteophyte complex with a superimposed left neural foraminal disc protrusion. Moderate left neural foraminal narrowing. Mild central canal narrowing. Mild right neural foraminal narrowing. C7-T1:  Normal.     UPPER THORACIC DISC SPACES:  Normal.     OTHER FINDINGS:  None. IMPRESSION:     Multilevel spondylosis most pronounced at C5-C6 and C6-C7. No cord compression or cord signal abnormality. Workstation performed: [id number]        Imaging    MRI cervical spine wo contrast (Order: [id number]) - 9/19/2023    Result History    MRI cervical spine wo contrast (Order #[id number]) on 9/21/2023 - Order Result History Report    Order Report     Order Details      Order Questions    Question Answer   What is the patient's sedation requirement?  If Medication for Claustrophobia is selected, order medication at this point. No Sedation   Does this procedure require the 3T MRI at [place] or [place]? No   Release to patient through [address] [place] [address] Immediate   Is order priority selected as STAT? No   Exam reason Cervical radiculopathy   Note: Enter reason for exam              Result Information    Status Priority Source   Final result (9/21/2023  4:43 PM) Routine        MRI cervical spine wo contrast: Result Notes     Severa Bellows  9/25/2023  8:18 AM EDT         Patient notified    Severa Bellows  9/22/2023  1:15 PM EDT         Left message to call back    Delma Horner  9/22/2023  7:34 AM EDT         Patient asked if he should continue with Chiropractor next week as he has two appointments scheduled? Delma Horner  9/21/2023  5:11 PM EDT         Called patient, no answer, left detailed message. Provided information to Dr. Scotty Eisenberg office and Physical Therapy locations. Any questions or concerns to call the office. Carla Nino DO  9/21/2023  4:59 PM EDT         Notify patient MRI of neck shows disc and osteophytes at C5-6 and C6-7 on the left side with protruding disks causing symptoms into his left arm and I recommend a pain management evaluation. He can also do physical therapy            Reason for Exam    Cervical radiculopathy; Cervical radiculopathy, no red flags; Cervical radiculopathy   Dx: Left cervical radiculopathy [B12.81 (ICD-10-CM)]     All Reviewers List    Severa Bellows on 9/25/2023  8:18 AM   Severa Bellows on 9/22/2023  1:35 PM   Carla Nino DO on 9/21/2023  5:01 PM         MRI cervical spine wo contrast: Patient Communication     Add Comments   Seen       Signed by    Signed Date/Time  Phone Pager   [facility] 9/21/2023 16:43 [phone]        Exam Information    Status Exam Begun  Exam Ended  Performing Tech   Final [99] 9/19/2023 07:26 9/19/2023 08:01 Brandon Fong       Screening Form Questions     important suggestion  Questionnaires are displayed in the order they were answered. Answer Comment   Has patient changed into the appropriate hospital gown? What are your symptoms? Pain in between shoulders causes pain in left arm and numbness in hand    Do you have a cardiac pacemaker or internal defibrillator? NA    Please list /make/model:     Have you had any HEART surgery? None    Please list make/model:     Heart surgery: If "other", please describe:     Have you had any BRAIN surgery? None    Please list  or describe implant:     Brain surgery: If "other", please describe:     Have you had any EYE surgery? None    Eye surgery: If "other", please describe:     Have you ever injured your eyes with metal or metal fragments (grinding,metallic slivers)? No    Eye injury: Please describe:     Have you had any EAR surgery? None    Ear surgery: If "other", please describe:     Do you have an electronic "stimulation" device? None    Please provide  information:     Have you had any abdominal or pelvic surgery? No    Have you had any of the following abdominal or pelvic surgeries:     Abdominal/pelvic surgery: If "other", please describe:     Do you have any ORTHOPEDIC implants/devices? None    Do you have the following: None    Do you have liver disease? None    Do you have kidney disease? None    Have you had a problem with a previous MRI? No    Does the patient require pre-medication? Do you have a personal history of cancer? None    If "other", please specify:       Are you wearing medication patches? No    Are you wearing any of the following: None    Did the patient provide this information? Yes    Who provided this information (if not the patient)? Relationship to the patient:     Contact number:     MRI STAFF ONLY- Prior imaging reviewed in PACS (initials): sc    MRI STAFF ONLY- Epic chart reviewed (initials): sc    MRI STAFF ONLY: The patient was scheduled to receive MRI contrast and has received the Medication Guide.  No contrast given External Results Report    Open External Results Report      Encounter    View Encounter                     Patient Care Timeline    No data selected in time range    Pre-op Summary    Pre-op             Recovery Summary    Recovery                 Routing History    None             No orders to display       No orders of the defined types were placed in this encounter.

## 2023-10-19 NOTE — TELEPHONE ENCOUNTER
Scheduled patient for VAL 10/27/23  Patient denies RX blood thinners/ NSAIDS  Nothing to eat or drink 1 hour prior to procedure  Needs to arrange transportation  Proper clothing for procedure  No vaccines 2 weeks prior or after procedure  If ill or place on antibiotics, please call to reschedule

## 2023-10-20 ENCOUNTER — HOSPITAL ENCOUNTER (OUTPATIENT)
Dept: RADIOLOGY | Facility: HOSPITAL | Age: 48
Setting detail: OUTPATIENT SURGERY
Discharge: HOME | End: 2023-10-20
Payer: COMMERCIAL

## 2023-10-20 ENCOUNTER — HOSPITAL ENCOUNTER (OUTPATIENT)
Facility: AMBULARY SURGERY CENTER | Age: 48
Setting detail: OUTPATIENT SURGERY
Discharge: HOME | End: 2023-10-20
Attending: STUDENT IN AN ORGANIZED HEALTH CARE EDUCATION/TRAINING PROGRAM | Admitting: STUDENT IN AN ORGANIZED HEALTH CARE EDUCATION/TRAINING PROGRAM
Payer: COMMERCIAL

## 2023-10-20 VITALS
OXYGEN SATURATION: 96 % | HEART RATE: 82 BPM | DIASTOLIC BLOOD PRESSURE: 98 MMHG | SYSTOLIC BLOOD PRESSURE: 126 MMHG | RESPIRATION RATE: 18 BRPM | TEMPERATURE: 98.4 F

## 2023-10-20 DIAGNOSIS — Z92.241 S/P EPIDURAL STEROID INJECTION: ICD-10-CM

## 2023-10-20 PROCEDURE — A9585 GADOBUTROL INJECTION: HCPCS | Performed by: STUDENT IN AN ORGANIZED HEALTH CARE EDUCATION/TRAINING PROGRAM

## 2023-10-20 PROCEDURE — 62321 NJX INTERLAMINAR CRV/THRC: CPT | Performed by: STUDENT IN AN ORGANIZED HEALTH CARE EDUCATION/TRAINING PROGRAM

## 2023-10-20 RX ORDER — GADOBUTROL 604.72 MG/ML
INJECTION INTRAVENOUS AS NEEDED
Status: DISCONTINUED | OUTPATIENT
Start: 2023-10-20 | End: 2023-10-20 | Stop reason: HOSPADM

## 2023-10-20 RX ORDER — LIDOCAINE HYDROCHLORIDE 10 MG/ML
INJECTION, SOLUTION EPIDURAL; INFILTRATION; INTRACAUDAL; PERINEURAL AS NEEDED
Status: DISCONTINUED | OUTPATIENT
Start: 2023-10-20 | End: 2023-10-20 | Stop reason: HOSPADM

## 2023-10-20 RX ORDER — DEXAMETHASONE SODIUM PHOSPHATE 10 MG/ML
INJECTION, SOLUTION INTRAMUSCULAR; INTRAVENOUS AS NEEDED
Status: DISCONTINUED | OUTPATIENT
Start: 2023-10-20 | End: 2023-10-20 | Stop reason: HOSPADM

## 2023-10-20 NOTE — OP NOTE
Pre-procedure Diagnosis: Cervical Radiculopathy  Post-procedure Diagnosis: Cervical radiculopathy  Procedure Title(s):  1. C7-T1 interlaminar epidural steroid injection      2. Intraoperative fluoroscopy  Attending Surgeon:   Leonardo Dillard MD  Anesthesia:   Local     Indications: The patient is a 50y.o. year-old male with a diagnosis of Cervical radiculopathy. The patient's history and physical exam were reviewed. The risks, benefits and alternatives to the procedure were discussed, and all questions were answered to the patient's satisfaction. The patient agreed to proceed, and written informed consent was obtained. Procedure in Detail: The patient was brought into the procedure room and placed in the prone position on the fluoroscopy table. The area of the cervical spine was prepped with chlorhexidine gluconate solution times one and draped in a sterile manner. The C7-T1 interspace was identified and marked under AP fluoroscopy. The skin and subcutaneous tissues in the area were anesthetized with 1% lidocaine. A 20-gauge Tuohy epidural needle was directed toward the interspace under fluoroscopic guidance until the ligamentum flavum was engaged. The C-arm was oblique to the right to obtain a contra-lateral oblique view. From this point, a loss of resistance technique with air was used to identify entrance of the needle into the epidural space. Once an appropriate loss was obtained, negative aspiration was confirmed, and 1 ml gadolinium  contrast solution was injected. An appropriate epidurogram was noted. Then, after negative aspiration, a solution consisting of 1-mL dexamethasone (10mg/ml) and 2-mL preservative-free saline was easily injected. The needle was removed with a 1% lidocaine flush. The patient's back was cleaned and a bandage was placed over the site of needle insertion. Disposition: The patient tolerated the procedure well, and there were no apparent complications.  The patient was taken to the recovery area where written discharge instructions for the procedure were given.      Estimated Blood Loss: None  Specimens Obtained: N/A

## 2023-10-20 NOTE — INTERVAL H&P NOTE
H&P reviewed. After examining the patient I find no changes in the patients condition since the H&P had been written.     Vitals:    10/20/23 0920   BP: 126/98   Pulse: 86   Resp: 18   Temp: 98.4 °F (36.9 °C)   SpO2: 97%

## 2023-10-20 NOTE — DISCHARGE INSTRUCTIONS
Epidural Steroid Injection   WHAT YOU NEED TO KNOW:   An epidural steroid injection (ROGERS) is a procedure to inject steroid medicine into the epidural space. The epidural space is between your spinal cord and vertebrae. Steroids reduce inflammation and fluid buildup in your spine that may be causing pain. You may be given pain medicine along with the steroids. ACTIVITY  Do not drive or operate machinery today. No strenuous activity today - bending, lifting, etc.  You may resume normal activites starting tomorrow - start slowly and as tolerated. You may shower today, but no tub baths or hot tubs. You may have numbness for several hours from the local anesthetic. Please use caution and common sense, especially with weight-bearing activities. CARE OF THE INJECTION SITE  If you have soreness or pain, apply ice to the area today (20 minutes on/20 minutes off). Starting tomorrow, you may use warm, moist heat or ice if needed. You may have an increase or change in your discomfort for 36-48 hours after your treatment. Apply ice and continue with any pain medication you have been prescribed. Notify [facility] if you have any of the following: redness, drainage, swelling, headache, stiff neck or fever above 100°F.    SPECIAL INSTRUCTIONS  Our office will contact you in approximately 7 days for a progress report. MEDICATIONS  Continue to take all routine medications. Our office may have instructed you to hold some medications. As no general anesthesia was used in today's procedure, you should not experience any side effects related to anesthesia. If you are diabetic, the steroids used in today's injection may temporarily increase your blood sugar levels after the first few days after your injection. Please keep a close eye on your sugars and alert the doctor who manages your diabetes if your sugars are significantly high from your baseline or you are symptomatic.      If you have a problem specifically related to your procedure, please call our office at [phone]. Problems not related to your procedure should be directed to your primary care physician.

## 2023-10-27 ENCOUNTER — TELEPHONE (OUTPATIENT)
Dept: PAIN MEDICINE | Facility: CLINIC | Age: 48
End: 2023-10-27

## 2023-10-27 NOTE — TELEPHONE ENCOUNTER
Caller: pt    Doctor: avinash    Reason for call: 60% improvement. Still has numbness.    Call back#: [phone]

## 2023-11-14 DIAGNOSIS — E03.8 OTHER SPECIFIED HYPOTHYROIDISM: ICD-10-CM

## 2023-11-14 RX ORDER — LEVOTHYROXINE SODIUM 0.07 MG/1
75 TABLET ORAL DAILY
Qty: 90 TABLET | Refills: 0 | Status: SHIPPED | OUTPATIENT
Start: 2023-11-14

## 2024-02-14 DIAGNOSIS — E03.8 OTHER SPECIFIED HYPOTHYROIDISM: ICD-10-CM

## 2024-02-14 RX ORDER — LEVOTHYROXINE SODIUM 0.07 MG/1
75 TABLET ORAL DAILY
Qty: 90 TABLET | Refills: 0 | Status: SHIPPED | OUTPATIENT
Start: 2024-02-14

## 2024-05-14 DIAGNOSIS — E03.8 OTHER SPECIFIED HYPOTHYROIDISM: ICD-10-CM

## 2024-05-15 RX ORDER — LEVOTHYROXINE SODIUM 0.07 MG/1
75 TABLET ORAL DAILY
Qty: 90 TABLET | Refills: 1 | Status: SHIPPED | OUTPATIENT
Start: 2024-05-15

## 2024-05-20 ENCOUNTER — OFFICE VISIT (OUTPATIENT)
Dept: FAMILY MEDICINE CLINIC | Facility: CLINIC | Age: 49
End: 2024-05-20
Payer: COMMERCIAL

## 2024-05-20 VITALS
HEART RATE: 87 BPM | OXYGEN SATURATION: 98 % | TEMPERATURE: 98.3 F | SYSTOLIC BLOOD PRESSURE: 136 MMHG | DIASTOLIC BLOOD PRESSURE: 80 MMHG | HEIGHT: 71 IN | RESPIRATION RATE: 16 BRPM | WEIGHT: 208.2 LBS | BODY MASS INDEX: 29.15 KG/M2

## 2024-05-20 DIAGNOSIS — J06.9 ACUTE URI: Primary | ICD-10-CM

## 2024-05-20 PROCEDURE — 99213 OFFICE O/P EST LOW 20 MIN: CPT | Performed by: NURSE PRACTITIONER

## 2024-05-20 RX ORDER — AZITHROMYCIN 250 MG/1
TABLET, FILM COATED ORAL
Qty: 6 TABLET | Refills: 0 | Status: SHIPPED | OUTPATIENT
Start: 2024-05-20 | End: 2024-05-24

## 2024-05-20 NOTE — ASSESSMENT & PLAN NOTE
You have been prescribed an antibiotic.  This medication is used to treat bacterial infections.  Follow the directions as prescribed.  Do not share this medication with anyone.  Do not stop taking her medication until it is finished, even if you are feeling better.  Taking medication with a full glass of water.  Call the office if you experience any possible side effects. Wash hands frequently to prevent the spread of infection.  Ibuprofen and/or tylenol as needed for pain or fever.  If not improving over the next 7-10 days, call office.

## 2024-05-20 NOTE — PROGRESS NOTES
OFFICE VISIT  Oleg Ortega 49 y.o. male MRN: [id number]          Assessment / Plan:  Problem List Items Addressed This Visit          Respiratory    Acute URI - Primary     You have been prescribed an antibiotic.  This medication is used to treat bacterial infections.  Follow the directions as prescribed.  Do not share this medication with anyone.  Do not stop taking her medication until it is finished, even if you are feeling better.  Taking medication with a full glass of water.  Call the office if you experience any possible side effects. Wash hands frequently to prevent the spread of infection.  Ibuprofen and/or tylenol as needed for pain or fever.  If not improving over the next 7-10 days, call office.           Relevant Medications    azithromycin (ZITHROMAX) 250 mg tablet         Reason For Visit / Chief Complaint  Chief Complaint   Patient presents with    Sinusitis        HPI:  Oleg Ortega is a 49 y.o. male who presents today for acute sick visit.  He reports  Sinus pressure, congestion, sinus pain, HA, mild sore throat, cough, productive,  Has been going on over one week, has been taking allergy medications with no relief  Attended two college graduations.    Historical Information   History reviewed. No pertinent past medical history.  Past Surgical History:   Procedure Laterality Date    EPIDURAL BLOCK INJECTION N/A 10/20/2023    Procedure: C7-T1  CERVICAL epidural steroid injection (91942);  Surgeon: Miguel Sandhu MD;  Location: [facility] MAIN OR;  Service: Pain Management     SHOULDER SURGERY       Social History   Social History     Substance and Sexual Activity   Alcohol Use No     Social History     Substance and Sexual Activity   Drug Use No     Social History     Tobacco Use   Smoking Status Never   Smokeless Tobacco Never     History reviewed. No pertinent family history.    Meds/Allergies   Allergies   Allergen Reactions    Contrast [Iodinated Contrast Media] Hives    Sulfa Antibiotics Hives  "      Meds:    Current Outpatient Medications:     azithromycin (ZITHROMAX) 250 mg tablet, Take 2 tablets today then 1 tablet daily x 4 days, Disp: 6 tablet, Rfl: 0    levothyroxine 75 mcg tablet, TAKE 1 TABLET (75 MCG TOTAL) BY MOUTH IN THE MORNING, Disp: 90 tablet, Rfl: 1      REVIEW OF SYSTEMS  Review of Systems   Constitutional:  Positive for fatigue. Negative for activity change, chills and fever.   HENT:  Positive for congestion, sinus pressure and sinus pain. Negative for ear discharge, ear pain, sore throat, tinnitus and trouble swallowing.    Eyes:  Negative for photophobia, pain, discharge, itching and visual disturbance.   Respiratory:  Positive for cough. Negative for chest tightness, shortness of breath and wheezing.    Cardiovascular:  Negative for chest pain and leg swelling.   Gastrointestinal:  Negative for abdominal distention, abdominal pain, constipation, diarrhea, nausea and vomiting.   Endocrine: Negative for polydipsia, polyphagia and polyuria.   Genitourinary:  Negative for dysuria and frequency.   Musculoskeletal:  Negative for arthralgias, myalgias, neck pain and neck stiffness.   Skin:  Negative for color change.   Neurological:  Negative for dizziness, syncope, weakness, numbness and headaches.   Hematological:  Does not bruise/bleed easily.   Psychiatric/Behavioral:  Negative for behavioral problems, confusion, self-injury, sleep disturbance and suicidal ideas. The patient is not nervous/anxious.            Current Vitals:   Blood Pressure: 136/80 (05/20/24 1539)  Pulse: 87 (05/20/24 1539)  Temperature: 98.3 °F (36.8 °C) (05/20/24 1539)  Respirations: 16 (05/20/24 1539)  Height: 5' 11\" (180.3 cm) (05/20/24 1539)  Weight - Scale: 94.4 kg (208 lb 3.2 oz) (05/20/24 1539)  SpO2: 98 % (05/20/24 1539)  [unfilled]    PHYSICAL EXAMS:  Physical Exam  Vitals and nursing note reviewed.   Constitutional:       Appearance: Normal appearance.   HENT:      Head: Normocephalic and atraumatic.      " Right Ear: Tympanic membrane, ear canal and external ear normal.      Left Ear: Ear canal and external ear normal.      Nose: Congestion present.      Right Sinus: Frontal sinus tenderness present.      Left Sinus: Frontal sinus tenderness present.      Mouth/Throat:      Pharynx: Posterior oropharyngeal erythema present.   Eyes:      Extraocular Movements: Extraocular movements intact.      Conjunctiva/sclera: Conjunctivae normal.      Pupils: Pupils are equal, round, and reactive to light.   Cardiovascular:      Rate and Rhythm: Normal rate and regular rhythm.      Pulses: Normal pulses.      Heart sounds: Normal heart sounds.   Pulmonary:      Effort: Pulmonary effort is normal.      Breath sounds: Normal breath sounds.   Musculoskeletal:      Cervical back: Normal range of motion.   Skin:     General: Skin is warm.   Neurological:      General: No focal deficit present.      Mental Status: He is alert and oriented to person, place, and time.   Psychiatric:         Mood and Affect: Mood normal.         Behavior: Behavior normal.         Thought Content: Thought content normal.         Judgment: Judgment normal.             Lab, imaging and other studies: I have personally reviewed pertinent reports.  .

## 2024-06-19 PROBLEM — J06.9 ACUTE URI: Status: RESOLVED | Noted: 2024-05-20 | Resolved: 2024-06-19

## 2024-07-03 ENCOUNTER — OFFICE VISIT (OUTPATIENT)
Dept: FAMILY MEDICINE CLINIC | Facility: CLINIC | Age: 49
End: 2024-07-03
Payer: COMMERCIAL

## 2024-07-03 VITALS
OXYGEN SATURATION: 97 % | SYSTOLIC BLOOD PRESSURE: 112 MMHG | BODY MASS INDEX: 28.81 KG/M2 | RESPIRATION RATE: 18 BRPM | DIASTOLIC BLOOD PRESSURE: 70 MMHG | WEIGHT: 205.8 LBS | TEMPERATURE: 97.2 F | HEIGHT: 71 IN | HEART RATE: 89 BPM

## 2024-07-03 DIAGNOSIS — J30.9 ALLERGIC SINUSITIS: Primary | ICD-10-CM

## 2024-07-03 DIAGNOSIS — R05.8 ALLERGIC COUGH: ICD-10-CM

## 2024-07-03 DIAGNOSIS — E03.8 OTHER SPECIFIED HYPOTHYROIDISM: ICD-10-CM

## 2024-07-03 DIAGNOSIS — Z63.79 STRESSFUL LIFE EVENT AFFECTING FAMILY: ICD-10-CM

## 2024-07-03 PROCEDURE — 99214 OFFICE O/P EST MOD 30 MIN: CPT | Performed by: FAMILY MEDICINE

## 2024-07-03 RX ORDER — BENZONATATE 200 MG/1
200 CAPSULE ORAL 3 TIMES DAILY PRN
Qty: 20 CAPSULE | Refills: 0 | Status: SHIPPED | OUTPATIENT
Start: 2024-07-03

## 2024-07-03 RX ORDER — FLUTICASONE PROPIONATE 50 MCG
1 SPRAY, SUSPENSION (ML) NASAL DAILY
Qty: 16 G | Refills: 1 | Status: SHIPPED | OUTPATIENT
Start: 2024-07-03

## 2024-07-03 NOTE — ASSESSMENT & PLAN NOTE
Stress from travel back and forth between [place] and [place] and overall congestion brought on more coughing recently and I recommend treatment for the sinuses congestion and add benzonatate

## 2024-07-03 NOTE — ASSESSMENT & PLAN NOTE
He is traveling back and forth between [place] and [place] now after his father-in-law passed away and he is developing a lot of congestion in the head and sinuses and we will address it now in addition he has been coping with the stress of this life activity.  Mother in law now living with them here in [facility] or assisted living

## 2024-07-03 NOTE — ASSESSMENT & PLAN NOTE
Recommend decongestant medication such as Allegra-D twice daily 12-hour also recommend nasal saline spray and Flonase if not improved can also add Astepro.  If all else fails will need a short course of prednisone pending patient's response   denies loose teeth or dentures

## 2024-07-03 NOTE — PROGRESS NOTES
Assessment/Plan:       Problem List Items Addressed This Visit          Respiratory    Allergic sinusitis - Primary     Recommend decongestant medication such as Allegra-D twice daily 12-hour also recommend nasal saline spray and Flonase if not improved can also add Astepro.  If all else fails will need a short course of prednisone pending patient's response         Relevant Medications    benzonatate (TESSALON) 200 MG capsule    fluticasone (FLONASE) 50 mcg/act nasal spray       Endocrine    Other specified hypothyroidism     Thyroid function remained stable continuing on levothyroxine at 75 mcg tablets daily repeat laboratory work as scheduled next visit            Other    Stressful life event affecting family     He is traveling back and forth between [place] and [place] now after his father-in-law passed away and he is developing a lot of congestion in the head and sinuses and we will address it now in addition he has been coping with the stress of this life activity.  Mother in law now living with them here in [place] till [facility] or assisted living         Allergic cough     Stress from travel back and forth between [place] and [place] and overall congestion brought on more coughing recently and I recommend treatment for the sinuses congestion and add benzonatate         Relevant Medications    benzonatate (TESSALON) 200 MG capsule    fluticasone (FLONASE) 50 mcg/act nasal spray         Subjective:      Patient ID: Oleg Ortega is a 49 y.o. male.    HPI    The following portions of the patient's history were reviewed and updated as appropriate: allergies, current medications, past family history, past medical history, past social history, past surgical history and problem list.    Review of Systems   Constitutional:  Negative for chills, fatigue and fever.   HENT:  Negative for congestion, nosebleeds, rhinorrhea, sinus pressure and sore throat.    Eyes:  Negative for discharge and redness.   Respiratory:   "Negative for cough and shortness of breath.    Cardiovascular:  Negative for chest pain, palpitations and leg swelling.   Gastrointestinal:  Negative for abdominal pain, blood in stool and nausea.   Endocrine: Negative for cold intolerance, heat intolerance and polyuria.   Genitourinary:  Negative for dysuria and frequency.   Musculoskeletal:  Negative for arthralgias, back pain and myalgias.   Skin:  Negative for rash.   Neurological:  Negative for dizziness, weakness and headaches.   Hematological:  Negative for adenopathy.   Psychiatric/Behavioral:  Negative for behavioral problems and sleep disturbance. The patient is not nervous/anxious.          Objective:      /70 (BP Location: Left arm, Patient Position: Sitting, Cuff Size: Standard)   Pulse 89   Temp (!) 97.2 °F (36.2 °C) (Tympanic)   Resp 18   Ht 5' 11\" (1.803 m)   Wt 93.4 kg (205 lb 12.8 oz)   SpO2 97%   BMI 28.70 kg/m²        Physical Exam  Vitals and nursing note reviewed.   Constitutional:       Appearance: Normal appearance. He is well-developed and normal weight.   HENT:      Head: Normocephalic and atraumatic.      Right Ear: Tympanic membrane and external ear normal.      Left Ear: Tympanic membrane and external ear normal.      Nose: Congestion present.   Eyes:      General: No scleral icterus.     Conjunctiva/sclera: Conjunctivae normal.      Pupils: Pupils are equal, round, and reactive to light.   Neck:      Thyroid: No thyromegaly.      Vascular: No JVD.   Cardiovascular:      Rate and Rhythm: Normal rate and regular rhythm.      Heart sounds: Normal heart sounds. No murmur heard.  Pulmonary:      Effort: Pulmonary effort is normal.      Breath sounds: Normal breath sounds. No wheezing or rales.   Chest:      Chest wall: No tenderness.   Abdominal:      General: Bowel sounds are normal. There is no distension.      Palpations: Abdomen is soft. There is no mass.      Tenderness: There is no abdominal tenderness. There is no " "guarding or rebound.   Musculoskeletal:         General: No tenderness or deformity. Normal range of motion.      Cervical back: Normal range of motion and neck supple.   Lymphadenopathy:      Cervical: No cervical adenopathy.   Skin:     General: Skin is warm and dry.      Findings: No erythema or rash.   Neurological:      General: No focal deficit present.      Mental Status: He is alert and oriented to person, place, and time.      Cranial Nerves: No cranial nerve deficit.      Deep Tendon Reflexes: Reflexes are normal and symmetric. Reflexes normal.   Psychiatric:         Mood and Affect: Mood normal.         Behavior: Behavior normal.         Thought Content: Thought content normal.         Judgment: Judgment normal.          Data:    Laboratory Results: I have personally reviewed the pertinent laboratory results/reports   Radiology/Other Diagnostic Testing Results: I have personally reviewed pertinent reports.       Lab Results   Component Value Date    WBC 9.02 09/02/2023    HGB 15.9 09/02/2023    HCT 49.4 (H) 09/02/2023    MCV 90 09/02/2023     09/02/2023     Lab Results   Component Value Date    K 3.9 09/02/2023     09/02/2023    CO2 30 09/02/2023    BUN 17 09/02/2023    CREATININE 0.96 09/02/2023    GLUF 101 (H) 09/02/2023    CALCIUM 9.3 09/02/2023    AST 15 09/02/2023    ALT 30 09/02/2023    ALKPHOS 57 09/02/2023    EGFR 93 09/02/2023     Lab Results   Component Value Date    CHOLESTEROL 214 (H) 09/02/2023    CHOLESTEROL 184 09/03/2022    CHOLESTEROL 200 09/25/2021     Lab Results   Component Value Date    HDL 42 09/02/2023    HDL 36 (L) 09/03/2022    HDL 37 (L) 09/25/2021     Lab Results   Component Value Date    LDLCALC 139 (H) 09/02/2023    LDLCALC 120 (H) 09/03/2022    LDLCALC 133 (H) 09/25/2021     Lab Results   Component Value Date    TRIG 165 (H) 09/02/2023    TRIG 142 09/03/2022    TRIG 150 09/25/2021     No results found for: \"CHOLHDL\"  Lab Results   Component Value Date    " "ROS3GXKQOKHM 3.969 09/02/2023     No results found for: \"HGBA1C\"  Lab Results   Component Value Date    PSA 0.5 09/02/2023       Louis Hansen DO      "

## 2024-07-03 NOTE — ASSESSMENT & PLAN NOTE
Thyroid function remained stable continuing on levothyroxine at 75 mcg tablets daily repeat laboratory work as scheduled next visit

## 2024-07-17 DIAGNOSIS — J30.9 ALLERGIC SINUSITIS: Primary | ICD-10-CM

## 2024-07-17 RX ORDER — PREDNISONE 10 MG/1
TABLET ORAL
Qty: 30 TABLET | Refills: 0 | Status: SHIPPED | OUTPATIENT
Start: 2024-07-17

## 2024-08-02 PROBLEM — J30.9 ALLERGIC SINUSITIS: Status: RESOLVED | Noted: 2024-07-03 | Resolved: 2024-08-02

## 2024-09-10 ENCOUNTER — TELEPHONE (OUTPATIENT)
Age: 49
End: 2024-09-10

## 2024-09-10 DIAGNOSIS — Z00.00 ANNUAL PHYSICAL EXAM: Primary | ICD-10-CM

## 2024-09-10 NOTE — TELEPHONE ENCOUNTER
Patients wife Hiral calling   Pt has annual phy appointment coming up requesting to get labs done today

## 2024-09-11 ENCOUNTER — OFFICE VISIT (OUTPATIENT)
Dept: FAMILY MEDICINE CLINIC | Facility: CLINIC | Age: 49
End: 2024-09-11
Payer: COMMERCIAL

## 2024-09-11 VITALS
HEIGHT: 71 IN | RESPIRATION RATE: 20 BRPM | BODY MASS INDEX: 29.15 KG/M2 | DIASTOLIC BLOOD PRESSURE: 64 MMHG | SYSTOLIC BLOOD PRESSURE: 120 MMHG | OXYGEN SATURATION: 96 % | TEMPERATURE: 98.4 F | HEART RATE: 95 BPM | WEIGHT: 208.2 LBS

## 2024-09-11 DIAGNOSIS — M54.2 NECK PAIN: ICD-10-CM

## 2024-09-11 DIAGNOSIS — E03.8 OTHER SPECIFIED HYPOTHYROIDISM: ICD-10-CM

## 2024-09-11 DIAGNOSIS — M54.12 LEFT CERVICAL RADICULOPATHY: ICD-10-CM

## 2024-09-11 DIAGNOSIS — Z00.00 ANNUAL PHYSICAL EXAM: Primary | ICD-10-CM

## 2024-09-11 DIAGNOSIS — E78.2 MIXED HYPERLIPIDEMIA: ICD-10-CM

## 2024-09-11 PROCEDURE — 99396 PREV VISIT EST AGE 40-64: CPT | Performed by: FAMILY MEDICINE

## 2024-09-11 NOTE — PROGRESS NOTES
Adult Annual Physical  Name: Oleg Ortega      : 1975      MRN: [id number]  Encounter Provider: Louis Hansen DO  Encounter Date: 2024   Encounter department: [facility]    Assessment & Plan  Annual physical exam  Form completion for patient's wellness physical for his employment.  Lab will be done tomorrow and results will be placed on chart and signed tomorrow after results come in         Mixed hyperlipidemia  Patient following a low-fat diet now lab work will be drawn by tomorrow and forms completed for his annual employment wellness physical         Neck pain  Improved neck pain overall without further radicular symptoms now he has minor numbness in the tip of his index finger left side otherwise doing active range of motion exercises as instructed through physical therapy and his chiropractor he goes to the chiropractor now about once per month for maintenance         Left cervical radiculopathy  Overall resolved continue with once per month chiropractic manipulation         Other specified hypothyroidism  Hypothyroidism stable monitoring TSH T4 continue levothyroxine         Immunizations and preventive care screenings were discussed with patient today. Appropriate education was printed on patient's after visit summary.    Discussed risks and benefits of prostate cancer screening. We discussed the controversial history of PSA screening for prostate cancer in the [place] as well as the risk of over detection and over treatment of prostate cancer by way of PSA screening.  The patient understands that PSA blood testing is an imperfect way to screen for prostate cancer and that elevated PSA levels in the blood may also be caused by infection, inflammation, prostatic trauma or manipulation, urological procedures, or by benign prostatic enlargement.    The role of the digital rectal examination in prostate cancer screening was also discussed and I discussed with  "him that there is large interobserver variability in the findings of digital rectal examination.    Counseling:  Alcohol/drug use: discussed moderation in alcohol intake, the recommendations for healthy alcohol use, and avoidance of illicit drug use.  Dental Health: discussed importance of regular tooth brushing, flossing, and dental visits.  Injury prevention: discussed safety/seat belts, safety helmets, smoke detectors, carbon dioxide detectors, and smoking near bedding or upholstery.  Sexual health: discussed sexually transmitted diseases, partner selection, use of condoms, avoidance of unintended pregnancy, and contraceptive alternatives.  Exercise: the importance of regular exercise/physical activity was discussed. Recommend exercise 3-5 times per week for at least 30 minutes.          History of Present Illness     Adult Annual Physical:  Patient presents for annual physical.     Diet and Physical Activity:  - Diet/Nutrition: well balanced diet.  - Exercise: no formal exercise.    General Health:  - Sleep: sleeps well.  - Hearing: normal hearing bilateral ears.  - Vision: no vision problems.  - Dental: regular dental visits.     Health:    - Urinary symptoms: none.     Review of Systems      Objective     /64 (BP Location: Left arm, Patient Position: Sitting, Cuff Size: Standard)   Pulse 95   Temp 98.4 °F (36.9 °C) (Tympanic)   Resp 20   Ht 5' 11\" (1.803 m)   Wt 94.4 kg (208 lb 3.2 oz)   SpO2 96%   BMI 29.04 kg/m²     Physical Exam    "

## 2024-09-11 NOTE — ASSESSMENT & PLAN NOTE
Form completion for patient's wellness physical for his employment.  Lab will be done tomorrow and results will be placed on chart and signed tomorrow after results come in

## 2024-09-11 NOTE — ASSESSMENT & PLAN NOTE
Patient following a low-fat diet now lab work will be drawn by tomorrow and forms completed for his annual employment wellness physical

## 2024-09-11 NOTE — ASSESSMENT & PLAN NOTE
Improved neck pain overall without further radicular symptoms now he has minor numbness in the tip of his index finger left side otherwise doing active range of motion exercises as instructed through physical therapy and his chiropractor he goes to the chiropractor now about once per month for maintenance

## 2024-09-12 ENCOUNTER — APPOINTMENT (OUTPATIENT)
Dept: LAB | Facility: CLINIC | Age: 49
End: 2024-09-12
Payer: COMMERCIAL

## 2024-09-12 DIAGNOSIS — Z00.00 ANNUAL PHYSICAL EXAM: ICD-10-CM

## 2024-09-12 LAB
ALBUMIN SERPL BCG-MCNC: 4.3 G/DL (ref 3.5–5)
ALP SERPL-CCNC: 68 U/L (ref 34–104)
ALT SERPL W P-5'-P-CCNC: 24 U/L (ref 7–52)
ANION GAP SERPL CALCULATED.3IONS-SCNC: 6 MMOL/L (ref 4–13)
AST SERPL W P-5'-P-CCNC: 18 U/L (ref 13–39)
BASOPHILS # BLD AUTO: 0.05 THOUSANDS/ΜL (ref 0–0.1)
BASOPHILS NFR BLD AUTO: 1 % (ref 0–1)
BILIRUB SERPL-MCNC: 0.68 MG/DL (ref 0.2–1)
BUN SERPL-MCNC: 13 MG/DL (ref 5–25)
CALCIUM SERPL-MCNC: 9.5 MG/DL (ref 8.4–10.2)
CHLORIDE SERPL-SCNC: 101 MMOL/L (ref 96–108)
CHOLEST SERPL-MCNC: 206 MG/DL
CO2 SERPL-SCNC: 32 MMOL/L (ref 21–32)
CREAT SERPL-MCNC: 1.1 MG/DL (ref 0.6–1.3)
EOSINOPHIL # BLD AUTO: 0.03 THOUSAND/ΜL (ref 0–0.61)
EOSINOPHIL NFR BLD AUTO: 1 % (ref 0–6)
ERYTHROCYTE [DISTWIDTH] IN BLOOD BY AUTOMATED COUNT: 13 % (ref 11.6–15.1)
GFR SERPL CREATININE-BSD FRML MDRD: 78 ML/MIN/1.73SQ M
GLUCOSE P FAST SERPL-MCNC: 91 MG/DL (ref 65–99)
HCT VFR BLD AUTO: 48.2 % (ref 36.5–49.3)
HDLC SERPL-MCNC: 39 MG/DL
HGB BLD-MCNC: 16 G/DL (ref 12–17)
IMM GRANULOCYTES # BLD AUTO: 0.01 THOUSAND/UL (ref 0–0.2)
IMM GRANULOCYTES NFR BLD AUTO: 0 % (ref 0–2)
LDLC SERPL CALC-MCNC: 136 MG/DL (ref 0–100)
LYMPHOCYTES # BLD AUTO: 1.76 THOUSANDS/ΜL (ref 0.6–4.47)
LYMPHOCYTES NFR BLD AUTO: 32 % (ref 14–44)
MCH RBC QN AUTO: 29.7 PG (ref 26.8–34.3)
MCHC RBC AUTO-ENTMCNC: 33.2 G/DL (ref 31.4–37.4)
MCV RBC AUTO: 90 FL (ref 82–98)
MONOCYTES # BLD AUTO: 0.56 THOUSAND/ΜL (ref 0.17–1.22)
MONOCYTES NFR BLD AUTO: 10 % (ref 4–12)
NEUTROPHILS # BLD AUTO: 3.07 THOUSANDS/ΜL (ref 1.85–7.62)
NEUTS SEG NFR BLD AUTO: 56 % (ref 43–75)
NONHDLC SERPL-MCNC: 167 MG/DL
NRBC BLD AUTO-RTO: 0 /100 WBCS
PLATELET # BLD AUTO: 255 THOUSANDS/UL (ref 149–390)
PMV BLD AUTO: 9.3 FL (ref 8.9–12.7)
POTASSIUM SERPL-SCNC: 4.5 MMOL/L (ref 3.5–5.3)
PROT SERPL-MCNC: 6.8 G/DL (ref 6.4–8.4)
PSA FREE MFR SERPL: 32.08 %
PSA FREE SERPL-MCNC: 0.2 NG/ML
PSA SERPL-MCNC: 0.64 NG/ML (ref 0–4)
RBC # BLD AUTO: 5.38 MILLION/UL (ref 3.88–5.62)
SODIUM SERPL-SCNC: 139 MMOL/L (ref 135–147)
TRIGL SERPL-MCNC: 157 MG/DL
TSH SERPL DL<=0.05 MIU/L-ACNC: 4.46 UIU/ML (ref 0.45–4.5)
WBC # BLD AUTO: 5.48 THOUSAND/UL (ref 4.31–10.16)

## 2024-09-12 PROCEDURE — 84443 ASSAY THYROID STIM HORMONE: CPT

## 2024-09-12 PROCEDURE — 84154 ASSAY OF PSA FREE: CPT

## 2024-09-12 PROCEDURE — 80061 LIPID PANEL: CPT

## 2024-09-12 PROCEDURE — 36415 COLL VENOUS BLD VENIPUNCTURE: CPT

## 2024-09-12 PROCEDURE — 85025 COMPLETE CBC W/AUTO DIFF WBC: CPT

## 2024-09-12 PROCEDURE — 84153 ASSAY OF PSA TOTAL: CPT

## 2024-09-12 PROCEDURE — 80053 COMPREHEN METABOLIC PANEL: CPT

## 2024-09-19 ENCOUNTER — OFFICE VISIT (OUTPATIENT)
Dept: FAMILY MEDICINE CLINIC | Facility: CLINIC | Age: 49
End: 2024-09-19
Payer: COMMERCIAL

## 2024-09-19 ENCOUNTER — TELEPHONE (OUTPATIENT)
Age: 49
End: 2024-09-19

## 2024-09-19 VITALS
DIASTOLIC BLOOD PRESSURE: 80 MMHG | HEART RATE: 93 BPM | HEIGHT: 71 IN | TEMPERATURE: 98.9 F | WEIGHT: 205.6 LBS | RESPIRATION RATE: 18 BRPM | BODY MASS INDEX: 28.78 KG/M2 | OXYGEN SATURATION: 97 % | SYSTOLIC BLOOD PRESSURE: 128 MMHG

## 2024-09-19 DIAGNOSIS — J30.9 ALLERGIC SINUSITIS: ICD-10-CM

## 2024-09-19 PROCEDURE — 99213 OFFICE O/P EST LOW 20 MIN: CPT | Performed by: FAMILY MEDICINE

## 2024-09-19 RX ORDER — PREDNISONE 10 MG/1
TABLET ORAL
Qty: 30 TABLET | Refills: 0 | Status: SHIPPED | OUTPATIENT
Start: 2024-09-19

## 2024-09-19 RX ORDER — AZITHROMYCIN 250 MG/1
TABLET, FILM COATED ORAL
Qty: 6 TABLET | Refills: 0 | Status: SHIPPED | OUTPATIENT
Start: 2024-09-19 | End: 2024-09-24

## 2024-09-19 NOTE — TELEPHONE ENCOUNTER
Pt called, said he believes his allergies turned into another sinus infection again, same thing happen in May. Wondering if provider could call in an antibiotic for this. Called into the [facility] in [place] if possible. Please advise and notify pt.

## 2024-09-19 NOTE — PROGRESS NOTES
Ambulatory Visit  Name: Oleg Ortega      : 1975      MRN: [id number]  Encounter Provider: Louis Hansen DO  Encounter Date: 2024   Encounter department: [facility]    Assessment & Plan  Allergic sinusitis  Add prednisone now and Zithromax if symptoms do not improve within 5 days he will follow-up in contact me for CT of the sinuses if necessary    Orders:    predniSONE 10 mg tablet; 3 tabs daily for 5 days then 2 tabs daily for 5 days then 1 tab daily for 5 days    azithromycin (ZITHROMAX) 250 mg tablet; Take 2 tablets on day 1 and 1 tablet days 2-5       History of Present Illness     Patient presents for sinus pressure and congestion left side of his head radiating to his ear and above his eyes after a hike in the woods this past weekend    Sinus Problem  Associated symptoms include congestion and sinus pressure. Pertinent negatives include no chills, coughing, headaches, shortness of breath or sore throat.         Review of Systems   Constitutional:  Negative for chills, fatigue and fever.   HENT:  Positive for congestion and sinus pressure. Negative for nosebleeds, rhinorrhea and sore throat.    Eyes:  Negative for discharge and redness.   Respiratory:  Negative for cough and shortness of breath.    Cardiovascular:  Negative for chest pain, palpitations and leg swelling.   Gastrointestinal:  Negative for abdominal pain, blood in stool and nausea.   Endocrine: Negative for cold intolerance, heat intolerance and polyuria.   Genitourinary:  Negative for dysuria and frequency.   Musculoskeletal:  Negative for arthralgias, back pain and myalgias.   Skin:  Negative for rash.   Neurological:  Negative for dizziness, weakness and headaches.   Hematological:  Negative for adenopathy.   Psychiatric/Behavioral:  Negative for behavioral problems and sleep disturbance. The patient is not nervous/anxious.            Objective     /80 (BP Location: Left arm, Patient  "Position: Sitting, Cuff Size: Standard)   Pulse 93   Temp 98.9 °F (37.2 °C) (Tympanic)   Resp 18   Ht 5' 11\" (1.803 m)   Wt 93.3 kg (205 lb 9.6 oz)   SpO2 97%   BMI 28.68 kg/m²     Physical Exam  Vitals and nursing note reviewed.   Constitutional:       Appearance: He is well-developed.   HENT:      Head: Normocephalic and atraumatic.      Right Ear: External ear normal.      Left Ear: External ear normal.      Nose: Congestion present.      Mouth/Throat:      Pharynx: Posterior oropharyngeal erythema present.   Eyes:      General: No scleral icterus.     Conjunctiva/sclera: Conjunctivae normal.      Pupils: Pupils are equal, round, and reactive to light.   Neck:      Thyroid: No thyromegaly.      Vascular: No JVD.   Cardiovascular:      Rate and Rhythm: Normal rate and regular rhythm.      Heart sounds: Normal heart sounds. No murmur heard.  Pulmonary:      Effort: Pulmonary effort is normal.      Breath sounds: Normal breath sounds. No wheezing or rales.   Chest:      Chest wall: No tenderness.   Abdominal:      General: Bowel sounds are normal. There is no distension.      Palpations: Abdomen is soft. There is no mass.      Tenderness: There is no abdominal tenderness. There is no guarding or rebound.   Musculoskeletal:         General: No tenderness or deformity. Normal range of motion.      Cervical back: Normal range of motion and neck supple.   Lymphadenopathy:      Cervical: No cervical adenopathy.   Skin:     General: Skin is warm and dry.      Findings: No erythema or rash.   Neurological:      Mental Status: He is alert and oriented to person, place, and time.      Cranial Nerves: No cranial nerve deficit.      Deep Tendon Reflexes: Reflexes are normal and symmetric. Reflexes normal.   Psychiatric:         Behavior: Behavior normal.         Thought Content: Thought content normal.         Judgment: Judgment normal.         "

## 2024-09-19 NOTE — ASSESSMENT & PLAN NOTE
Add prednisone now and Zithromax if symptoms do not improve within 5 days he will follow-up in contact me for CT of the sinuses if necessary    Orders:    predniSONE 10 mg tablet; 3 tabs daily for 5 days then 2 tabs daily for 5 days then 1 tab daily for 5 days    azithromycin (ZITHROMAX) 250 mg tablet; Take 2 tablets on day 1 and 1 tablet days 2-5

## 2024-10-19 PROBLEM — J30.9 ALLERGIC SINUSITIS: Status: RESOLVED | Noted: 2024-07-03 | Resolved: 2024-10-19

## 2024-11-16 DIAGNOSIS — E03.8 OTHER SPECIFIED HYPOTHYROIDISM: ICD-10-CM

## 2024-11-18 RX ORDER — LEVOTHYROXINE SODIUM 75 UG/1
75 TABLET ORAL DAILY
Qty: 90 TABLET | Refills: 1 | Status: SHIPPED | OUTPATIENT
Start: 2024-11-18

## 2025-05-11 DIAGNOSIS — E03.8 OTHER SPECIFIED HYPOTHYROIDISM: ICD-10-CM

## 2025-05-11 RX ORDER — LEVOTHYROXINE SODIUM 75 UG/1
75 TABLET ORAL DAILY
Qty: 90 TABLET | Refills: 0 | Status: SHIPPED | OUTPATIENT
Start: 2025-05-11

## 2025-07-27 DIAGNOSIS — J30.9 ALLERGIC SINUSITIS: ICD-10-CM

## 2025-07-29 RX ORDER — PREDNISONE 10 MG/1
TABLET ORAL
Qty: 30 TABLET | Refills: 0 | Status: SHIPPED | OUTPATIENT
Start: 2025-07-29

## (undated) DEVICE — TRAY PAIN SUPPORT

## (undated) DEVICE — TRAY EPIDURAL PERIFIX 20GA X 3.5IN TUOHY 8ML

## (undated) DEVICE — TOWEL SET X-RAY

## (undated) DEVICE — NEEDLE BLUNT 18 G X 1 1/2 W FILTER

## (undated) DEVICE — PLASTIC ADHESIVE BANDAGE: Brand: CURITY

## (undated) DEVICE — GLOVE SRG BIOGEL 7.5

## (undated) DEVICE — RADIOLOGY STERILE LABELS: Brand: CENTURION

## (undated) DEVICE — CHLORAPREP APPLICATOR TINTED 10.5ML ONE-STEP

## (undated) DEVICE — SYRINGE LUER LOK 7ML LOSS OF RESISTANCE

## (undated) DEVICE — SYRINGE 5ML LL

## (undated) DEVICE — IV SET EXT SM BORE CARESITE 8IN